# Patient Record
Sex: FEMALE | Race: ASIAN | NOT HISPANIC OR LATINO | Employment: OTHER | URBAN - METROPOLITAN AREA
[De-identification: names, ages, dates, MRNs, and addresses within clinical notes are randomized per-mention and may not be internally consistent; named-entity substitution may affect disease eponyms.]

---

## 2017-01-12 DIAGNOSIS — K51.90 ULCERATIVE COLITIS WITHOUT COMPLICATIONS (HCC): ICD-10-CM

## 2017-01-12 RX ORDER — GOLIMUMAB 100 MG/ML
INJECTION, SOLUTION SUBCUTANEOUS
Qty: 3 ML | Refills: 11 | Status: SHIPPED | OUTPATIENT
Start: 2017-01-12 | End: 2017-12-12 | Stop reason: SDUPTHER

## 2017-02-06 ENCOUNTER — TELEPHONE (OUTPATIENT)
Dept: GASTROENTEROLOGY | Facility: CLINIC | Age: 62
End: 2017-02-06

## 2017-02-06 ENCOUNTER — PREP FOR SURGERY (OUTPATIENT)
Dept: GASTROENTEROLOGY | Facility: CLINIC | Age: 62
End: 2017-02-06

## 2017-02-06 DIAGNOSIS — K51.00 ULCERATIVE PANCOLITIS WITHOUT COMPLICATION (HCC): Primary | ICD-10-CM

## 2017-02-06 RX ORDER — SODIUM CHLORIDE 0.9 % (FLUSH) 0.9 %
1-10 SYRINGE (ML) INJECTION AS NEEDED
Status: CANCELLED | OUTPATIENT
Start: 2017-02-06

## 2017-02-06 NOTE — TELEPHONE ENCOUNTER
----- Message from Rose Juarez sent at 2/6/2017  9:56 AM EST -----  Regarding: PT CALLED - [facility]  Contact: [phone]  DOES PT NEED NEW CASE REQUEST. SHE IS REQUESTING SOMETHING LATE APR.

## 2017-04-25 ENCOUNTER — ANESTHESIA (OUTPATIENT)
Dept: GASTROENTEROLOGY | Facility: HOSPITAL | Age: 62
End: 2017-04-25

## 2017-04-25 ENCOUNTER — ANESTHESIA EVENT (OUTPATIENT)
Dept: GASTROENTEROLOGY | Facility: HOSPITAL | Age: 62
End: 2017-04-25

## 2017-04-25 ENCOUNTER — HOSPITAL ENCOUNTER (OUTPATIENT)
Facility: HOSPITAL | Age: 62
Setting detail: HOSPITAL OUTPATIENT SURGERY
Discharge: HOME | End: 2017-04-25
Attending: INTERNAL MEDICINE | Admitting: INTERNAL MEDICINE

## 2017-04-25 VITALS
TEMPERATURE: 97.2 F | HEART RATE: 63 BPM | RESPIRATION RATE: 12 BRPM | OXYGEN SATURATION: 96 % | SYSTOLIC BLOOD PRESSURE: 104 MMHG | DIASTOLIC BLOOD PRESSURE: 70 MMHG | HEIGHT: 61 IN | WEIGHT: 125 LBS | BODY MASS INDEX: 23.6 KG/M2

## 2017-04-25 DIAGNOSIS — K51.00 ULCERATIVE PANCOLITIS WITHOUT COMPLICATION (HCC): ICD-10-CM

## 2017-04-25 LAB
ALBUMIN SERPL-MCNC: 3.8 G/DL (ref 3.5–5.2)
ALBUMIN/GLOB SERPL: 1 G/DL
ALP SERPL-CCNC: 59 U/L (ref 39–117)
ALT SERPL W P-5'-P-CCNC: 25 U/L (ref 1–33)
ANION GAP SERPL CALCULATED.3IONS-SCNC: 12.2 MMOL/L
AST SERPL-CCNC: 25 U/L (ref 1–32)
BASOPHILS # BLD AUTO: 0.04 10*3/MM3 (ref 0–0.2)
BASOPHILS NFR BLD AUTO: 0.7 % (ref 0–1.5)
BILIRUB SERPL-MCNC: 0.5 MG/DL (ref 0.1–1.2)
BUN BLD-MCNC: 8 MG/DL (ref 8–23)
BUN/CREAT SERPL: 12.1 (ref 7–25)
CALCIUM SPEC-SCNC: 9.3 MG/DL (ref 8.6–10.5)
CHLORIDE SERPL-SCNC: 102 MMOL/L (ref 98–107)
CO2 SERPL-SCNC: 25.8 MMOL/L (ref 22–29)
CREAT BLD-MCNC: 0.66 MG/DL (ref 0.57–1)
DEPRECATED RDW RBC AUTO: 45 FL (ref 37–54)
EOSINOPHIL # BLD AUTO: 0.1 10*3/MM3 (ref 0–0.7)
EOSINOPHIL NFR BLD AUTO: 1.7 % (ref 0.3–6.2)
ERYTHROCYTE [DISTWIDTH] IN BLOOD BY AUTOMATED COUNT: 13.3 % (ref 11.7–13)
GFR SERPL CREATININE-BSD FRML MDRD: 110 ML/MIN/1.73
GFR SERPL CREATININE-BSD FRML MDRD: 91 ML/MIN/1.73
GLOBULIN UR ELPH-MCNC: 3.8 GM/DL
GLUCOSE BLD-MCNC: 84 MG/DL (ref 65–99)
HCT VFR BLD AUTO: 42.8 % (ref 35.6–45.5)
HGB BLD-MCNC: 14.5 G/DL (ref 11.9–15.5)
IMM GRANULOCYTES # BLD: 0 10*3/MM3 (ref 0–0.03)
IMM GRANULOCYTES NFR BLD: 0 % (ref 0–0.5)
LYMPHOCYTES # BLD AUTO: 2.65 10*3/MM3 (ref 0.9–4.8)
LYMPHOCYTES NFR BLD AUTO: 45.4 % (ref 19.6–45.3)
MCH RBC QN AUTO: 31.5 PG (ref 26.9–32)
MCHC RBC AUTO-ENTMCNC: 33.9 G/DL (ref 32.4–36.3)
MCV RBC AUTO: 92.8 FL (ref 80.5–98.2)
MONOCYTES # BLD AUTO: 0.41 10*3/MM3 (ref 0.2–1.2)
MONOCYTES NFR BLD AUTO: 7 % (ref 5–12)
NEUTROPHILS # BLD AUTO: 2.64 10*3/MM3 (ref 1.9–8.1)
NEUTROPHILS NFR BLD AUTO: 45.2 % (ref 42.7–76)
PLATELET # BLD AUTO: 250 10*3/MM3 (ref 140–500)
PMV BLD AUTO: 9.1 FL (ref 6–12)
POTASSIUM BLD-SCNC: 4.2 MMOL/L (ref 3.5–5.2)
PROT SERPL-MCNC: 7.6 G/DL (ref 6–8.5)
RBC # BLD AUTO: 4.61 10*6/MM3 (ref 3.9–5.2)
SODIUM BLD-SCNC: 140 MMOL/L (ref 136–145)
WBC NRBC COR # BLD: 5.84 10*3/MM3 (ref 4.5–10.7)

## 2017-04-25 PROCEDURE — 80053 COMPREHEN METABOLIC PANEL: CPT | Performed by: INTERNAL MEDICINE

## 2017-04-25 PROCEDURE — 25010000002 ONDANSETRON PER 1 MG: Performed by: ANESTHESIOLOGY

## 2017-04-25 PROCEDURE — 85025 COMPLETE CBC W/AUTO DIFF WBC: CPT | Performed by: INTERNAL MEDICINE

## 2017-04-25 PROCEDURE — 25010000002 PROPOFOL 10 MG/ML EMULSION: Performed by: ANESTHESIOLOGY

## 2017-04-25 PROCEDURE — 88305 TISSUE EXAM BY PATHOLOGIST: CPT | Performed by: INTERNAL MEDICINE

## 2017-04-25 PROCEDURE — 45380 COLONOSCOPY AND BIOPSY: CPT | Performed by: INTERNAL MEDICINE

## 2017-04-25 RX ORDER — LIDOCAINE HYDROCHLORIDE 20 MG/ML
INJECTION, SOLUTION INFILTRATION; PERINEURAL AS NEEDED
Status: DISCONTINUED | OUTPATIENT
Start: 2017-04-25 | End: 2017-04-25 | Stop reason: SURG

## 2017-04-25 RX ORDER — SODIUM CHLORIDE, SODIUM LACTATE, POTASSIUM CHLORIDE, CALCIUM CHLORIDE 600; 310; 30; 20 MG/100ML; MG/100ML; MG/100ML; MG/100ML
30 INJECTION, SOLUTION INTRAVENOUS CONTINUOUS PRN
Status: DISCONTINUED | OUTPATIENT
Start: 2017-04-25 | End: 2017-04-25 | Stop reason: HOSPADM

## 2017-04-25 RX ORDER — ONDANSETRON 2 MG/ML
INJECTION INTRAMUSCULAR; INTRAVENOUS AS NEEDED
Status: DISCONTINUED | OUTPATIENT
Start: 2017-04-25 | End: 2017-04-25 | Stop reason: SURG

## 2017-04-25 RX ORDER — PROPOFOL 10 MG/ML
VIAL (ML) INTRAVENOUS AS NEEDED
Status: DISCONTINUED | OUTPATIENT
Start: 2017-04-25 | End: 2017-04-25 | Stop reason: SURG

## 2017-04-25 RX ORDER — SODIUM CHLORIDE 0.9 % (FLUSH) 0.9 %
1-10 SYRINGE (ML) INJECTION AS NEEDED
Status: DISCONTINUED | OUTPATIENT
Start: 2017-04-25 | End: 2017-04-25 | Stop reason: HOSPADM

## 2017-04-25 RX ADMIN — SODIUM CHLORIDE, POTASSIUM CHLORIDE, SODIUM LACTATE AND CALCIUM CHLORIDE 30 ML/HR: 600; 310; 30; 20 INJECTION, SOLUTION INTRAVENOUS at 10:56

## 2017-04-25 RX ADMIN — LIDOCAINE HYDROCHLORIDE 100 MG: 20 INJECTION, SOLUTION INFILTRATION; PERINEURAL at 11:14

## 2017-04-25 RX ADMIN — PROPOFOL 200 MG: 10 INJECTION, EMULSION INTRAVENOUS at 11:12

## 2017-04-25 RX ADMIN — ONDANSETRON 4 MG: 2 INJECTION INTRAMUSCULAR; INTRAVENOUS at 11:16

## 2017-04-25 NOTE — H&P
Chief Complaint   Patient presents with   • Follow-up         History of Present Illness: She is on monthly SImponi and has been on it for 1-2 yrs. Her PPD was +, she then went to see a Pulmonary Doctor. Her Quantiferon Gold test was negative 9/16. She feels OK. NO fevers,chills, night sweats. No diarrhea. No Rectal bleeding. No abdominal or chest pain. No nausea or vomting. Weight increasing. She also takes Asaccol  mg 2/day in addition to the Simponi.      Medical History          Past Medical History   Diagnosis Date   • Anemia     • Chronic pancolonic ulcerative colitis         DIAGNOSED 25 YEARS AGO   • Internal hemorrhoids     • Leiomyoma               Surgical History           Past Surgical History   Procedure Laterality Date   • Hysterectomy       • Endoscopy   03/20/2015       ERYTHEMATOUS MUCOSA IN STOMACH, LEIOMYOMA, REACTIVE MUCOSAL CHANGES, CHRONIC INFLAMMATION   • Colonoscopy   03/20/2015       MILD TO MODERATE PANCOLITIS W/SCARRING THROUGHOUT COLON, IH, ACUTE CRYPTITIS, ACTIVE COLITIS   • Upper gastrointestinal endoscopy   03/20/2015       COMPLETED BY DR. MATT FIGUEROA               Current Outpatient Prescriptions:   • PARoxetine (PAXIL) 10 MG tablet, , Disp: , Rfl:   • SIMPONI 100 MG/ML solution auto-injector, INJECT 2 PENS UNDER THE SKIN ON DAY 1 AND 1 PEN UNDER THE SKIN ON DAY 15, Disp: 3 syringe, Rfl: 11  • mesalamine (ASACOL HD) 800 MG EC tablet, Take two tablets by mouth twice a day, Disp: 120 tablet, Rfl: 6  • mesalamine (CANASA) 1000 MG suppository, Insert 1,000 mg into the rectum nightly., Disp: , Rfl:      No Known Allergies           Family History   Problem Relation Age of Onset   • Liver cancer Mother            Social History    Social History            Social History   • Marital status:        Spouse name: N/A   • Number of children: N/A   • Years of education: N/A          Occupational History   • Not on file.              Social History Main Topics    • Smoking status:  Never Smoker    • Smokeless tobacco: Never Used    • Alcohol use Yes         Comment: on occ    • Drug use: No    • Sexual activity: Not on file       Other Topics Concern   • Not on file      Social History Narrative            Review of Systems   All other systems reviewed and are negative.            Vitals:     10/05/16 1346   BP: 110/70         Physical Exam   Constitutional: She is oriented to person, place, and time. She appears well-developed and well-nourished. No distress.   HENT:   Head: Normocephalic and atraumatic. Hair is normal.   Right Ear: Hearing, tympanic membrane, external ear and ear canal normal. No drainage. No decreased hearing is noted.   Left Ear: Hearing, tympanic membrane, external ear and ear canal normal. No decreased hearing is noted.   Nose: No nasal deformity.   Mouth/Throat: Oropharynx is clear and moist.   Eyes: Conjunctivae, EOM and lids are normal. Pupils are equal, round, and reactive to light. Right eye exhibits no discharge. Left eye exhibits no discharge.   Neck: Normal range of motion. Neck supple. No JVD present. No tracheal deviation present. No thyromegaly present.   Cardiovascular: Normal rate, regular rhythm, normal heart sounds, intact distal pulses and normal pulses. Exam reveals no gallop and no friction rub.   No murmur heard.  Pulmonary/Chest: Effort normal and breath sounds normal. No respiratory distress. She has no wheezes. She has no rales. She exhibits no tenderness.   Abdominal: Soft. Bowel sounds are normal. She exhibits no distension and no mass. There is no tenderness. There is no rebound and no guarding. No hernia.   Musculoskeletal: Normal range of motion. She exhibits no edema, tenderness or deformity.   Lymphadenopathy:   She has no cervical adenopathy.   Neurological: She is alert and oriented to person, place, and time. She has normal reflexes. She displays normal reflexes. No cranial nerve deficit. She exhibits normal muscle tone. Coordination  normal.   Skin: Skin is warm and dry. No rash noted. She is not diaphoretic. No erythema.   Psychiatric: She has a normal mood and affect. Her behavior is normal. Judgment and thought content normal.   Vitals reviewed.        Amber was seen today for follow-up.     Diagnoses and all orders for this visit:     Ulcerative colitis without complications, unspecified location  - Case Request; Standing  - sodium chloride 0.9 % flush 1-10 mL; Infuse 1-10 mL into a venous catheter As Needed for line care.  - Case Request  - CBC & Differential  - Comprehensive Metabolic Panel  - QuantiFERON TB Gold     Immunocompromised  - CBC & Differential  - Comprehensive Metabolic Panel  - QuantiFERON TB Gold     Other orders  - Implement Anesthesia orders day of procedure.; Standing  - Obtain informed consent; Standing  - Verify bowel prep was successful; Standing  - Give tap water enema if bowel prep was insufficient; Standing  - Insert Peripheral IV; Standing  - Saline Lock & Maintain IV Access; Standing        Assessment:  1) h/o ulcerative colitis diagnosed 30 yrs ago. Doing well on Simponi  2) h/o false + PPD with Quantiferon Gold - 9/15.     Recommendations:  1) Labs: Quantiferon Gold, CBC, CMP  2) Continue Simponi and the Asacol  mg 2/day.  3) Repeat colonoscopy in 3/2017.     4/25/17 - NO change in the above H and P. Joni Lucia M.D.

## 2017-04-25 NOTE — PLAN OF CARE
Problem: Patient Care Overview (Adult)  Goal: Plan of Care Review  Outcome: Ongoing (interventions implemented as appropriate)    04/25/17 1025   Coping/Psychosocial Response Interventions   Plan Of Care Reviewed With patient       Goal: Adult Individualization and Mutuality  Outcome: Ongoing (interventions implemented as appropriate)  Goal: Discharge Needs Assessment  Outcome: Ongoing (interventions implemented as appropriate)    04/25/17 1025   Discharge Needs Assessment   Concerns To Be Addressed no discharge needs identified   Discharge Disposition home or self-care   Living Environment   Transportation Available car;family or friend will provide         Problem: GI Endoscopy (Adult)  Intervention: Monitor/Manage Procedure Recovery    04/25/17 1025   Respiratory Interventions   Airway/Ventilation Management airway patency maintained   Coping/Psychosocial Interventions   Environmental Support calm environment promoted   Activity   Activity Type activity adjusted per tolerance   Cardiac Interventions   Warming Thermoregulation Maintenance warm blankets applied       Intervention: Prevent Kimberly-procedural Injury    04/25/17 1025   Positioning   Positioning high Fowlers   Head Of Bed (HOB) Position HOB elevated         Goal: Signs and Symptoms of Listed Potential Problems Will be Absent or Manageable (GI Endoscopy)  Outcome: Ongoing (interventions implemented as appropriate)    04/25/17 1025   GI Endoscopy   Problems Assessed (GI Endoscopy) all

## 2017-04-25 NOTE — ANESTHESIA POSTPROCEDURE EVALUATION
Patient: Amber Sesay    Procedure Summary     Date Anesthesia Start Anesthesia Stop Room / Location    04/25/17 1111 1135  [facility]       Procedure Diagnosis Surgeon Provider    COLONOSCOPY to cecum and terminal ileum with biopsy (N/A ) Ulcerative pancolitis without complication  (Ulcerative pancolitis without complication [K51.00]) MD Ria Pelletier MD          Anesthesia Type: MAC  Last vitals  /70 (04/25/17 1148)    Temp      Pulse 63 (04/25/17 1148)   Resp 12 (04/25/17 1148)    SpO2 96 % (04/25/17 1148)      Post Anesthesia Care and Evaluation    Patient location during evaluation: PACU  Patient participation: complete - patient participated  Level of consciousness: awake  Pain management: adequate  Airway patency: patent  Anesthetic complications: No anesthetic complications  PONV Status: none  Cardiovascular status: acceptable  Respiratory status: acceptable  Hydration status: acceptable

## 2017-04-25 NOTE — ANESTHESIA PREPROCEDURE EVALUATION
Anesthesia Evaluation     Patient summary reviewed and Nursing notes reviewed   history of anesthetic complications:     Airway   Mallampati: II  TM distance: >3 FB  Neck ROM: full  possible difficult intubation  Dental - normal exam     Pulmonary - normal exam   Cardiovascular - normal exam        Neuro/Psych  GI/Hepatic/Renal/Endo      Musculoskeletal     Abdominal  - normal exam    Bowel sounds: normal.   Substance History      OB/GYN          Other                                    Anesthesia Plan    ASA 1     MAC     Anesthetic plan and risks discussed with patient.

## 2017-04-26 LAB
CYTO UR: NORMAL
LAB AP CASE REPORT: NORMAL
Lab: NORMAL
PATH REPORT.FINAL DX SPEC: NORMAL
PATH REPORT.GROSS SPEC: NORMAL

## 2017-05-01 ENCOUNTER — TELEPHONE (OUTPATIENT)
Dept: GASTROENTEROLOGY | Facility: CLINIC | Age: 62
End: 2017-05-01

## 2017-06-27 ENCOUNTER — TELEPHONE (OUTPATIENT)
Dept: GASTROENTEROLOGY | Facility: CLINIC | Age: 62
End: 2017-06-27

## 2017-06-28 NOTE — TELEPHONE ENCOUNTER
Per Agapito and patient--- her insurance plan will not cover Asacol but will cover Apriso-a new rx needs to be sent in please w/strength, quantity and refills.  Leslie RAYMOND    Message to Dr Lucia.

## 2017-06-30 RX ORDER — MESALAMINE 0.38 G/1
CAPSULE, EXTENDED RELEASE ORAL
Qty: 180 CAPSULE | Refills: 3 | Status: SHIPPED | OUTPATIENT
Start: 2017-06-30 | End: 2018-06-20 | Stop reason: SDUPTHER

## 2017-06-30 NOTE — TELEPHONE ENCOUNTER
Please send in a prescription for her for Apriso (there is only one strength) 2 po daily. You could give a 30 day or 90 day supply with one year of refills. darin

## 2017-11-01 ENCOUNTER — OFFICE VISIT (OUTPATIENT)
Dept: GASTROENTEROLOGY | Facility: CLINIC | Age: 62
End: 2017-11-01

## 2017-11-01 VITALS
DIASTOLIC BLOOD PRESSURE: 72 MMHG | HEIGHT: 61 IN | TEMPERATURE: 97.7 F | BODY MASS INDEX: 23.3 KG/M2 | WEIGHT: 123.4 LBS | SYSTOLIC BLOOD PRESSURE: 114 MMHG

## 2017-11-01 DIAGNOSIS — K51.30 ULCERATIVE RECTOSIGMOIDITIS WITHOUT COMPLICATION (HCC): Primary | ICD-10-CM

## 2017-11-01 PROCEDURE — 99213 OFFICE O/P EST LOW 20 MIN: CPT | Performed by: INTERNAL MEDICINE

## 2017-11-01 NOTE — PROGRESS NOTES
Chief Complaint   Patient presents with   • Follow-up   • Ulcerative Colitis       History of Present Illness: 60 yo female with a h/o left sided ulcerative colitis diagnosed about 30 yrs ago, now on Simponi. We reviewed the results of the 4/17 colonsocopy. In 10/16 her Quantiferon Gold test was negative. (she had a false positive PPD) Last dose of Simponi last week. On Simponi x 4 yrs (minus the 8 mos). She is on Apriso 1 BID.  No diarrhea or rectal bleeding. No melena. No constipation. NO abdominal or chest pain. No nausea or vomting. NO fevers, chills, night sweats. No coughing or SOA. Weight stable.     Past Medical History:   Diagnosis Date   • Anemia    • Chronic pancolonic ulcerative colitis     DIAGNOSED 25 YEARS AGO   • History of transfusion    • Internal hemorrhoids    • Leiomyoma    • PONV (postoperative nausea and vomiting)        Past Surgical History:   Procedure Laterality Date   • COLONOSCOPY  03/20/2015    MILD TO MODERATE PANCOLITIS W/SCARRING THROUGHOUT COLON, IH, ACUTE CRYPTITIS, ACTIVE COLITIS   • COLONOSCOPY N/A 4/25/2017    Lots of scarring throughout the colon from her long h/o of ulcerative colitis, mild left sided ulcerative colitis, IH.  PATH: mild chronic active colitis.     • ENDOSCOPY  03/20/2015    ERYTHEMATOUS MUCOSA IN STOMACH, LEIOMYOMA, REACTIVE MUCOSAL CHANGES, CHRONIC INFLAMMATION   • HYSTERECTOMY     • UPPER GASTROINTESTINAL ENDOSCOPY  03/20/2015    COMPLETED BY DR. MATT FIGUEROA         Current Outpatient Prescriptions:   •  mesalamine (APRISO) 0.375 G 24 hr capsule, Take 2 tablets by mouth daily, Disp: 180 capsule, Rfl: 3  •  PARoxetine (PAXIL) 10 MG tablet, Take 10 mg by mouth Every Morning., Disp: , Rfl:   •  SIMPONI 100 MG/ML solution auto-injector, INJECT 100 MG UNDER THE SKIN EVERY FOUR WEEKS, Disp: 3 mL, Rfl: 11    No Known Allergies    Family History   Problem Relation Age of Onset   • Liver cancer Mother        Social History     Social History   • Marital status:       Spouse name: N/A   • Number of children: N/A   • Years of education: N/A     Occupational History   • Not on file.     Social History Main Topics   • Smoking status: Never Smoker   • Smokeless tobacco: Never Used   • Alcohol use Yes      Comment: on occ   • Drug use: No   • Sexual activity: Defer     Other Topics Concern   • Not on file     Social History Narrative       Review of Systems   All other systems reviewed and are negative.      Vitals:    11/01/17 1121   BP: 114/72   Temp: 97.7 °F (36.5 °C)       Physical Exam   Constitutional: She is oriented to person, place, and time. She appears well-developed and well-nourished. No distress.   HENT:   Head: Normocephalic and atraumatic. Hair is normal.   Right Ear: Hearing, tympanic membrane, external ear and ear canal normal. No drainage. No decreased hearing is noted.   Left Ear: Hearing, tympanic membrane, external ear and ear canal normal. No decreased hearing is noted.   Nose: No nasal deformity.   Mouth/Throat: Oropharynx is clear and moist.   Eyes: Conjunctivae, EOM and lids are normal. Pupils are equal, round, and reactive to light. Right eye exhibits no discharge. Left eye exhibits no discharge.   Neck: Normal range of motion. Neck supple. No JVD present. No tracheal deviation present. No thyromegaly present.   Cardiovascular: Normal rate, regular rhythm, normal heart sounds, intact distal pulses and normal pulses.  Exam reveals no gallop and no friction rub.    No murmur heard.  Pulmonary/Chest: Effort normal and breath sounds normal. No respiratory distress. She has no wheezes. She has no rales. She exhibits no tenderness.   Abdominal: Soft. Bowel sounds are normal. She exhibits no distension and no mass. There is no tenderness. There is no rebound and no guarding. No hernia.   Musculoskeletal: Normal range of motion. She exhibits no edema, tenderness or deformity.   Lymphadenopathy:     She has no cervical adenopathy.   Neurological: She is  alert and oriented to person, place, and time. She has normal reflexes. She displays normal reflexes. No cranial nerve deficit. She exhibits normal muscle tone. Coordination normal.   Skin: Skin is warm and dry. No rash noted. She is not diaphoretic. No erythema.   Psychiatric: She has a normal mood and affect. Her behavior is normal. Judgment and thought content normal.   Vitals reviewed.      Amber was seen today for follow-up and ulcerative colitis.    Diagnoses and all orders for this visit:    Ulcerative rectosigmoiditis without complication  -     QuantiFERON TB Gold  -     C-reactive Protein  -     Sedimentation Rate  -     CBC & Differential  -     Comprehensive Metabolic Panel      Assessment:  1) h/o left sided ulcerative colitis, on Simponi  2) h/o +PPD (false +) but her Quantiferon Gold has been negative.    Recommendations:  1) Repeat c/s in 4/19.  2) Labs: Quantiferron Gold, CBC, etc.  3) Send this note and the results of the C/S done in 4/17 to her PCP in [place], [place].  4) Continue Apriso 1 BID and Simponi 100 mg SQ q 4 weeks.   5) f/u one year.     Return in about 1 year (around 11/1/2018).    Joni Lucia MD  11/1/2017

## 2017-11-04 LAB
ALBUMIN SERPL-MCNC: 4.8 G/DL (ref 3.6–4.8)
ALBUMIN/GLOB SERPL: 1.7 {RATIO} (ref 1.2–2.2)
ALP SERPL-CCNC: 75 IU/L (ref 39–117)
ALT SERPL-CCNC: 35 IU/L (ref 0–32)
ANNOTATION COMMENT IMP: NORMAL
AST SERPL-CCNC: 25 IU/L (ref 0–40)
BASOPHILS # BLD AUTO: 0 X10E3/UL (ref 0–0.2)
BASOPHILS NFR BLD AUTO: 0 %
BILIRUB SERPL-MCNC: 0.3 MG/DL (ref 0–1.2)
BUN SERPL-MCNC: 14 MG/DL (ref 8–27)
BUN/CREAT SERPL: 26 (ref 12–28)
CALCIUM SERPL-MCNC: 9.5 MG/DL (ref 8.7–10.3)
CHLORIDE SERPL-SCNC: 100 MMOL/L (ref 96–106)
CO2 SERPL-SCNC: 25 MMOL/L (ref 18–29)
CREAT SERPL-MCNC: 0.54 MG/DL (ref 0.57–1)
CRP SERPL-MCNC: 1.3 MG/L (ref 0–4.9)
EOSINOPHIL # BLD AUTO: 0 X10E3/UL (ref 0–0.4)
EOSINOPHIL NFR BLD AUTO: 1 %
ERYTHROCYTE [DISTWIDTH] IN BLOOD BY AUTOMATED COUNT: 13.3 % (ref 12.3–15.4)
ERYTHROCYTE [SEDIMENTATION RATE] IN BLOOD BY WESTERGREN METHOD: 8 MM/HR (ref 0–40)
GAMMA INTERFERON BACKGROUND BLD IA-ACNC: 0.14 IU/ML
GFR SERPLBLD CREATININE-BSD FMLA CKD-EPI: 101 ML/MIN/1.73
GFR SERPLBLD CREATININE-BSD FMLA CKD-EPI: 117 ML/MIN/1.73
GLOBULIN SER CALC-MCNC: 2.9 G/DL (ref 1.5–4.5)
GLUCOSE SERPL-MCNC: 82 MG/DL (ref 65–99)
HCT VFR BLD AUTO: 43.3 % (ref 34–46.6)
HGB BLD-MCNC: 15.1 G/DL (ref 11.1–15.9)
IMM GRANULOCYTES # BLD: 0 X10E3/UL (ref 0–0.1)
IMM GRANULOCYTES NFR BLD: 0 %
LYMPHOCYTES # BLD AUTO: 3.3 X10E3/UL (ref 0.7–3.1)
LYMPHOCYTES NFR BLD AUTO: 38 %
M TB IFN-G BLD-IMP: NEGATIVE
M TB IFN-G CD4+ BCKGRND COR BLD-ACNC: <0 IU/ML
M TB IFN-G CD4+ T-CELLS BLD-ACNC: 0.08 IU/ML
MCH RBC QN AUTO: 31.9 PG (ref 26.6–33)
MCHC RBC AUTO-ENTMCNC: 34.9 G/DL (ref 31.5–35.7)
MCV RBC AUTO: 92 FL (ref 79–97)
MITOGEN IGNF BLD-ACNC: >10 IU/ML
MONOCYTES # BLD AUTO: 0.6 X10E3/UL (ref 0.1–0.9)
MONOCYTES NFR BLD AUTO: 7 %
NEUTROPHILS # BLD AUTO: 4.8 X10E3/UL (ref 1.4–7)
NEUTROPHILS NFR BLD AUTO: 54 %
PLATELET # BLD AUTO: 334 X10E3/UL (ref 150–379)
POTASSIUM SERPL-SCNC: 5.1 MMOL/L (ref 3.5–5.2)
PROT SERPL-MCNC: 7.7 G/DL (ref 6–8.5)
QUANTIFERON INCUBATION: NORMAL
RBC # BLD AUTO: 4.73 X10E6/UL (ref 3.77–5.28)
SERVICE CMNT-IMP: NORMAL
SODIUM SERPL-SCNC: 141 MMOL/L (ref 134–144)
WBC # BLD AUTO: 8.8 X10E3/UL (ref 3.4–10.8)

## 2017-11-10 NOTE — PROGRESS NOTES
Tell her that her TB test (the Quantiferon Gold test) came back negative, which is good.  Her CBC also came back normal.  Her liver function tests were normal except one liver function test (ALT of 35) came back minimally elevated.  I would recommend that she come back in one month to have repeat lab work done.  I would have her get another comprehensive metabolic profile and a hepatitis profile looking for hepatitis A, B, and C.  Please order these tests and I will cosign the order.

## 2017-11-15 ENCOUNTER — TELEPHONE (OUTPATIENT)
Dept: GASTROENTEROLOGY | Facility: CLINIC | Age: 62
End: 2017-11-15

## 2017-11-15 DIAGNOSIS — R79.89 ELEVATED LFTS: Primary | ICD-10-CM

## 2017-11-15 NOTE — TELEPHONE ENCOUNTER
Called pt and advised per Dr Lucia that her tb test came back neg, which is good.  Her cbc also came back normal.  Her liver function tests were normal except one liver function test (ALT of 35) came back min elevated.  He recommends that she get repeat lab in one month.  Pt verb understanding and made lab appt for 12/13 at 11am.    Lab work - cmp and hep profile ordered.  Message sent to Dr Lucia to cosign orders.

## 2017-11-15 NOTE — TELEPHONE ENCOUNTER
----- Message from Joni Lucia MD sent at 11/10/2017  6:47 AM EST -----  Tell her that her TB test (the Quantiferon Gold test) came back negative, which is good.  Her CBC also came back normal.  Her liver function tests were normal except one liver function test (ALT of 35) came back minimally elevated.  I would recommend that she come back in one month to have repeat lab work done.  I would have her get another comprehensive metabolic profile and a hepatitis profile looking for hepatitis A, B, and C.  Please order these tests and I will cosign the order.

## 2017-11-29 ENCOUNTER — TELEPHONE (OUTPATIENT)
Dept: SURGERY | Facility: CLINIC | Age: 62
End: 2017-11-29

## 2017-11-29 NOTE — TELEPHONE ENCOUNTER
New pt scheduled for 12/19/2017, arrive at 10:30.  Called and left message for pt to call back for confirmation.     Need to prescreen for possible breast MRI.    PT CALLED CONFIRMED APPT.     PRESCREENED FOR POS MRI  GOOD ON ALL QUESTIONS  HT 5'1  WT  120-125

## 2017-12-03 DIAGNOSIS — D05.10 DUCTAL CARCINOMA IN SITU (DCIS) OF BREAST, UNSPECIFIED LATERALITY: Primary | ICD-10-CM

## 2017-12-07 ENCOUNTER — HOSPITAL ENCOUNTER (OUTPATIENT)
Dept: MRI IMAGING | Facility: HOSPITAL | Age: 62
Discharge: HOME | End: 2017-12-07
Attending: SURGERY | Admitting: FAMILY MEDICINE

## 2017-12-07 DIAGNOSIS — D05.10 DUCTAL CARCINOMA IN SITU (DCIS) OF BREAST, UNSPECIFIED LATERALITY: ICD-10-CM

## 2017-12-07 PROCEDURE — C8908 MRI W/O FOL W/CONT, BREAST,: HCPCS

## 2017-12-07 PROCEDURE — 0 GADOBENATE DIMEGLUMINE 529 MG/ML SOLUTION: Performed by: SURGERY

## 2017-12-07 PROCEDURE — 82565 ASSAY OF CREATININE: CPT

## 2017-12-07 PROCEDURE — A9577 INJ MULTIHANCE: HCPCS | Performed by: SURGERY

## 2017-12-07 PROCEDURE — 0159T HC MRI BREAST COMPUTER ANALYSIS: CPT

## 2017-12-07 RX ADMIN — GADOBENATE DIMEGLUMINE 11 ML: 529 INJECTION, SOLUTION INTRAVENOUS at 15:44

## 2017-12-08 LAB — CREAT BLDA-MCNC: 0.8 MG/DL (ref 0.6–1.3)

## 2017-12-12 DIAGNOSIS — K51.90 ULCERATIVE COLITIS WITHOUT COMPLICATIONS (HCC): ICD-10-CM

## 2017-12-14 LAB
ALBUMIN SERPL-MCNC: 4.5 G/DL (ref 3.5–5.2)
ALBUMIN/GLOB SERPL: 1.3 G/DL
ALP SERPL-CCNC: 68 U/L (ref 39–117)
ALT SERPL-CCNC: 19 U/L (ref 1–33)
AST SERPL-CCNC: 21 U/L (ref 1–32)
BILIRUB SERPL-MCNC: 0.4 MG/DL (ref 0.1–1.2)
BUN SERPL-MCNC: 14 MG/DL (ref 8–23)
BUN/CREAT SERPL: 22.6 (ref 7–25)
CALCIUM SERPL-MCNC: 10 MG/DL (ref 8.6–10.5)
CHLORIDE SERPL-SCNC: 100 MMOL/L (ref 98–107)
CO2 SERPL-SCNC: 28.8 MMOL/L (ref 22–29)
CREAT SERPL-MCNC: 0.62 MG/DL (ref 0.57–1)
GFR SERPLBLD CREATININE-BSD FMLA CKD-EPI: 118 ML/MIN/1.73
GFR SERPLBLD CREATININE-BSD FMLA CKD-EPI: 97 ML/MIN/1.73
GLOBULIN SER CALC-MCNC: 3.5 GM/DL
GLUCOSE SERPL-MCNC: 79 MG/DL (ref 65–99)
HAV IGM SERPL QL IA: NEGATIVE
HBV CORE IGM SERPL QL IA: NEGATIVE
HBV SURFACE AG SERPL QL IA: NEGATIVE
HCV AB S/CO SERPL IA: <0.1 S/CO RATIO (ref 0–0.9)
POTASSIUM SERPL-SCNC: 5 MMOL/L (ref 3.5–5.2)
PROT SERPL-MCNC: 8 G/DL (ref 6–8.5)
SODIUM SERPL-SCNC: 140 MMOL/L (ref 136–145)

## 2017-12-14 RX ORDER — GOLIMUMAB 100 MG/ML
INJECTION, SOLUTION SUBCUTANEOUS
Qty: 3 ML | Refills: 11 | Status: SHIPPED | OUTPATIENT
Start: 2017-12-14 | End: 2018-01-05

## 2017-12-18 ENCOUNTER — OFFICE VISIT (OUTPATIENT)
Dept: SURGERY | Facility: CLINIC | Age: 62
End: 2017-12-18

## 2017-12-18 ENCOUNTER — TELEPHONE (OUTPATIENT)
Dept: GASTROENTEROLOGY | Facility: CLINIC | Age: 62
End: 2017-12-18

## 2017-12-18 ENCOUNTER — PREP FOR SURGERY (OUTPATIENT)
Dept: OTHER | Facility: HOSPITAL | Age: 62
End: 2017-12-18

## 2017-12-18 VITALS
SYSTOLIC BLOOD PRESSURE: 145 MMHG | HEIGHT: 61 IN | WEIGHT: 126 LBS | BODY MASS INDEX: 23.79 KG/M2 | DIASTOLIC BLOOD PRESSURE: 83 MMHG | HEART RATE: 70 BPM | OXYGEN SATURATION: 97 %

## 2017-12-18 DIAGNOSIS — K51.00 ULCERATIVE PANCOLITIS WITHOUT COMPLICATION (HCC): ICD-10-CM

## 2017-12-18 DIAGNOSIS — Z87.898 HISTORY OF POSTOPERATIVE NAUSEA AND VOMITING: ICD-10-CM

## 2017-12-18 DIAGNOSIS — D05.12 BREAST NEOPLASM, TIS (DCIS), LEFT: Primary | ICD-10-CM

## 2017-12-18 DIAGNOSIS — D05.12 DUCTAL CARCINOMA IN SITU (DCIS) OF LEFT BREAST: Primary | ICD-10-CM

## 2017-12-18 DIAGNOSIS — R92.1 BREAST CALCIFICATIONS ON MAMMOGRAM: ICD-10-CM

## 2017-12-18 DIAGNOSIS — Z80.3 FH: BREAST CANCER: ICD-10-CM

## 2017-12-18 DIAGNOSIS — Z85.41 HISTORY OF CERVICAL CANCER: ICD-10-CM

## 2017-12-18 PROCEDURE — 99244 OFF/OP CNSLTJ NEW/EST MOD 40: CPT | Performed by: SURGERY

## 2017-12-18 RX ORDER — SODIUM CHLORIDE, SODIUM LACTATE, POTASSIUM CHLORIDE, CALCIUM CHLORIDE 600; 310; 30; 20 MG/100ML; MG/100ML; MG/100ML; MG/100ML
100 INJECTION, SOLUTION INTRAVENOUS CONTINUOUS
Status: CANCELLED | OUTPATIENT
Start: 2018-01-11

## 2017-12-18 RX ORDER — DIAZEPAM 5 MG/1
10 TABLET ORAL ONCE
Status: CANCELLED | OUTPATIENT
Start: 2018-01-11 | End: 2017-12-18

## 2017-12-18 RX ORDER — LIDOCAINE AND PRILOCAINE 25; 25 MG/G; MG/G
CREAM TOPICAL ONCE
Qty: 30 G | Refills: 0 | Status: SHIPPED | OUTPATIENT
Start: 2017-12-18 | End: 2017-12-18

## 2017-12-18 NOTE — TELEPHONE ENCOUNTER
Dr Harvey's office calling requesting to speak with Dr Lucia about Ms Bravo.  Advised Dr Lucia is currently not in office but is in hospital performing procedures.  She verb understanding and left Dr Harvey's cell phone so Dr Lucia call when he is free [phone].

## 2017-12-18 NOTE — TELEPHONE ENCOUNTER
EMLA Cream 30 g tube no refills   Apply topically once for one dose to nipple areolar and 1 cm outside of nipple and cover day of surgery.    lml

## 2017-12-18 NOTE — PROGRESS NOTES
Chief Complaint: Amber Thrasher is a 62 y.o. female who was seen in consultation at the request of Dora Ross MD  for newly diagnosed breast cancer    History of Present Illness:  Patient presents with newly diagnosed DCIS, . She noted no new masses, skin changes, nipple discharge, nipple changes prior to her most recent imaging.    Her most recent imaging includes the following:   10/26/16  [facility]      MAMMO/ SCREENING MAMMO AMBER THRASHER  Heterogeneously dense.  BIRADS category 1. Negative     10/31/2017     [facility]  MAMMO SCREENING  AMBER THRASHER  There is a new group of microcalcifications lower inner left breast 3-4 cm from the nipple.   BIRADS category 0.    11/16/2017 [facility]     MAMMO/DIAG UNT LT DIGITAL AMBER THRASHER  Group of pleomorphic microcalcifications lower inner left breast 6:00 to 7:00 3-4 cm from the nipple.  BIRADS category 4        She had a biopsy on the following day that showed:   11/27/17 [facility]     MAMMO/STERIOTACTIC LOCALIZE LT     AMBER THRASHER  9 gauge Eviva.  18 core biopsy.  Tissue marker was deployed.  Post procedure mammography showed the tissue marker to be at the target site.     11/27/17 [facility] PATHOLOGY REPORT AMBER THRASHER  1. Left breast lower inner quadrant at 3-4 cm from nipple: clinically with microcalcifications:  ductal carcinoma in situ, solid and cribriform types, low grade.  Negative for invasive malignancy.    2. Left breast, lower inner quadrant at 3-4 cm from nipple; clinically tissue without microcalcifications:  single focus of ductal carcinoma in situ, solid type with microcalcification identified.    11/27/17 [facility]  HORMONE RECEPTORS  AMBER THRASHER  Estrogen  100   Progesterone   100       I then arranged for a breast MRI:  12/07/17 [facility] BREAST MRI  AMBER THRASHER  Left breast 6’oclock the order of 5cm from the nipple there is a metallic clip within a  postbiopsy cavity.  There are some thin linear enhancement seen along the anterior and posterior margins of the cavity.  The cavity and associated thin linear enhancement measured 2.2 cm in anterior to posterior dimension, 1.5 cm in the superior to inferior dimension and 2.9 cm in the mediolateral dimension.  There are no other areas of abnormal enhancement in the left breast.  No evidence for left axillary adenopathy.  There are no areas of abnormal enhancement or morphology in the right breast.    She has not had a breast biopsy in the past.  She has her ovaries, she had her uterus removed in 1990 for cervical cancer.    She is here for evaluation.    Review of Systems:  Review of Systems   All other systems reviewed and are negative.       Past Medical and Surgical History:  Breast Biopsy History:  Patient had not had a breast biopsy prior to her cancer diagnosis.  Breast Cancer HIstory:  Patient does not have a past medical history of breast cancer.  Breast Operations, and year:  None   Obstetric/Gynecologic History:  Age menstrual periods began:teens   Patient is postmenopausal due to removal of her uterus in the following year:1990   Number of pregnancies:3  Number of live births: 2  Number of abortions or miscarriages: 1  Age of delivery of first child: 22  Patient did not breast feed.  Length of time taking birth control pills: 7 yrs   Patient has never taken hormone replacement  Patient had uterus removed 1990    Past Surgical History:   Procedure Laterality Date   • COLONOSCOPY  03/20/2015    MILD TO MODERATE PANCOLITIS W/SCARRING THROUGHOUT COLON, IH, ACUTE CRYPTITIS, ACTIVE COLITIS   • COLONOSCOPY N/A 4/25/2017    Lots of scarring throughout the colon from her long h/o of ulcerative colitis, mild left sided ulcerative colitis, IH.  PATH: mild chronic active colitis.     • ENDOSCOPY  03/20/2015    ERYTHEMATOUS MUCOSA IN STOMACH, LEIOMYOMA, REACTIVE MUCOSAL CHANGES, CHRONIC INFLAMMATION   • HYSTERECTOMY    "  • UPPER GASTROINTESTINAL ENDOSCOPY  03/20/2015    COMPLETED BY DR. MATT FIGUEROA       Past Medical History:   Diagnosis Date   • Anemia    • Cervical cancer 1990   • Chronic pancolonic ulcerative colitis     DIAGNOSED 25 YEARS AGO   • History of transfusion    • Internal hemorrhoids    • Leiomyoma    • PONV (postoperative nausea and vomiting)        Prior Hospitalizations, other than for surgery or childbirth, and year:  Ulcerative colitis-Anemic     Social History     Social History   • Marital status:      Spouse name: N/A   • Number of children: N/A   • Years of education: N/A     Occupational History   • Not on file.     Social History Main Topics   • Smoking status: Never Smoker   • Smokeless tobacco: Never Used   • Alcohol use Yes      Comment: on occ   • Drug use: No   • Sexual activity: Defer     Other Topics Concern   • Not on file     Social History Narrative     Patient is .  Patient is unemployed.  Patient drinks 2 servings of caffeine per day.    Family History:  Family History   Problem Relation Age of Onset   • Liver cancer Mother    • Breast cancer Sister 73   • Breast cancer Other 38     neice gene negative    • Cancer Sister      of unknown orgin        Vital Signs:  /83  Pulse 70  Ht 154.9 cm (61\")  Wt 57.2 kg (126 lb)  SpO2 97%  BMI 23.81 kg/m2     Medications:    Current Outpatient Prescriptions:   •  mesalamine (APRISO) 0.375 G 24 hr capsule, Take 2 tablets by mouth daily, Disp: 180 capsule, Rfl: 3  •  PARoxetine (PAXIL) 10 MG tablet, Take 10 mg by mouth Every Morning., Disp: , Rfl:   •  SIMPONI 100 MG/ML solution auto-injector, INJECT 100 MG UNDER THE SKIN EVERY FOUR WEEKS, Disp: 3 mL, Rfl: 11     Allergies:  No Known Allergies    Physical Examination:  /83  Pulse 70  Ht 154.9 cm (61\")  Wt 57.2 kg (126 lb)  SpO2 97%  BMI 23.81 kg/m2  General Appearance:  Patient is in no distress.  She is well kept and has an average build.   Psychiatric:  Patient with " appropriate mood and affect. Alert and oriented to self, time, and place.    Breast, RIGHT:  small sized, symmetric with the contralateral side.  Breast skin is without erythema, edema, rashes.  There are no visible abnormalities upon inspection during the arm-raising maneuver or with hands on hips in the sitting position. There is no nipple retraction, discharge or nipple/areolar skin changes.There are no masses palpable in the sitting or supine positions.    Breast, LEFT:  small sized, symmetric with the contralateral side.  Breast skin is without erythema, edema, rashes.  There are no visible abnormalities upon inspection during the arm-raising maneuver or with hands on hips in the sitting position. There is no nipple retraction, discharge or nipple/areolar skin changes.There are no masses palpable in the sitting or supine positions. There are ecchymoses present and a well healing mammotomy with no erythema, warmth, drainage.    Lymphatic:  There is no axillary, cervical, infraclavicular, or supraclavicular adenopathy bilaterally.  Eyes:  Pupils are round and reactive to light.  Cardiovascular:  Heart rate and rhythm are regular.  Respiratory:  Lungs are clear bilaterally with no crackles or wheezes in any lung field.  Gastrointestinal:  Abdomen is soft, nondistended, and nontender.     Musculoskeletal:  Good strength in all 4 extremities.   There is good range of motion in both shoulders.    Skin:  No new skin lesions or rashes on the skin excluding the breast (see breast exam above).        Imaging:  10/26/16  [facility]      MAMMO/ SCREENING MAMMO FRANCISCA THRASHER  Heterogeneously dense.  BIRADS category 1. Negative     10/31/2017     [facility]  MAMMO SCREENING  FRANCISCA THRASHER  There is a new group of microcalcifications lower inner left breast 3-4 cm from the nipple.   BIRADS category 0.    11/16/2017 [facility]     MAMMO/DIAG UNT LT DIGITAL FRANCISCA THRASHER  Group of pleomorphic  microcalcifications lower inner left breast 6:00 to 7:00 3-4 cm from the nipple.  BIRADS category 4    12/07/17 [facility] BREAST MRI  FRANCISCA THRASHER  Left breast 6’oclock the order of 5cm from the nipple there is a metallic clip within a postbiopsy cavity.  There are some thin linear enhancement seen along the anterior and posterior margins of the cavity.  The cavity and associated thin linear enhancement measured 2.2 cm in anterior to posterior dimension, 1.5 cm in the superior to inferior dimension and 2.9 cm in the mediolateral dimension.  There are no other areas of abnormal enhancement in the left breast.  No evidence for left axillary adenopathy.  There are no areas of abnormal enhancement or morphology in the right breast.    Pathology:  11/27/17 [facility]     MAMMO/STERIOTACTIC LOCALIZE LT     FRANCISCA THRASHER  9 gauge Eviva.  18 core biopsy.  Tissue marker was deployed.  Post procedure mammography showed the tissue marker to be at the target site.     11/27/17 [facility] PATHOLOGY REPORT FRANCISCA THRASHER  3. Left breast lower inner quadrant at 3-4 cm from nipple: clinically with microcalcifications:  ductal carcinoma in situ, solid and cribriform types, low grade.  Negative for invasive malignancy.    4. Left breast, lower inner quadrant at 3-4 cm from nipple; clinically tissue without microcalcifications:  single focus of ductal carcinoma in situ, solid type with microcalcification identified.    11/27/17 [facility]  HORMONE RECEPTORS  FRANCISCA THRASHER  Estrogen  100   Progesterone   100    Procedures:      Assessment:   Diagnosis Plan   1. Ductal carcinoma in situ (DCIS) of left breast     2. Breast calcifications on mammogram     3. FH: breast cancer     4. History of cervical cancer     5. Ulcerative pancolitis without complication     6. History of postoperative nausea and vomiting     1-2  Left breast lower inner quadrant, 6:30, 3 cm from the nipple, 6 mm of calcification  clustered on her initial mammogram.  2.2 cm postbiopsy hematoma with enhancement at the perimeter of the cavity.    Clinical stage TisN 0 stage 0.    3-  1 of 2 sisters age 73.  One niece, the daughter of cysts #2 diagnosed in late 20s.    4-  Cervical cancer in 1990, treated with total abdominal hysterectomy and required no chemotherapy.    5-  Ulcerative colitis diagnosed age 34, treated by Dr. Lucia at [facility].    Plan:  We reviewed the difference in systemic therapy versus locoregional. We discussed that she will be seeing a medical oncologist in the adjuvant setting. We discussed that for DCIS, that we would expect a 98% breast cancer specific survival and that the medications offered by medical oncology are for risk reduction for additional breast cancers and for a reduction in ipsilateral locoregional recurrence.     We discussed that for unifocal DCIS, there are 2 options for locoregional treatment that have equivalent survival: total mastectomy versus lumpectomy and radiation, the former with sentinel node biopsy.     She is interested in breast conservation.     We discussed the option of oncoplastic closure with contralateral mastopexy, and she is not interested in this at this time.    We discussed her having a plastic surgical consultation in the postoperative period as an alternative option, if she finds that she is dissatisfied with her symmetry. We did discuss that most plastic surgeons will not operate on the irradiated breast, but could perform a reduction on the contralateral side for size symmetry down the road.     We discussed the following procedure:  LEFT needle localized lumpectomy    Plan for 6:30 radial incision.    She understands the risks of lumpectomy including bleeding, infection, seroma and 25% chance of positive margins.    NCCN guidelines have been followed.    She will receive a recommendation for radiation after surgery.  She prefers to have adjuvant therapy in [place]  with Dr. David Mirza and with Dr. Paniagua.    She does see Dr. Lucia for her ulcerative colitis, and we will call him to discuss the implications on healing of the simponi weekly injections and the mesalamine.    We discussed her family history as well as her personal history of breast and cervical cancer and she would like to talk with genetics in more detail about this. We discussed her family history and that testing an affected member is ore useful than testing a nonaffected, as her daughter was interested in pursuing testing, due to mom and aunt having breast cancer.   We will arrange for her to talk to our genetics colleagues.    She has a reported history of postoperative nausea and vomiting, which we will remember on the day of surgery.    Anne Harvey MD    - Addendum- Dr Lucia said to keep her on the mesalamine but to stop the TNF inhibitor simponi one month prior. We will call.    Next Appointment:  Return for surgery.      EMR Dragon/transcription disclaimer:    Much of this encounter note is an electronic transcription/translocation of spoken language to printed text.  The electronic translation of spoken language may permit erroneous, or at times, nonsensical words or phrases to be inadvertently transcribed.  Although I have reviewed the note from such areas, some may still exist.

## 2017-12-19 ENCOUNTER — TELEPHONE (OUTPATIENT)
Dept: SURGERY | Facility: CLINIC | Age: 62
End: 2017-12-19

## 2017-12-19 ENCOUNTER — PREP FOR SURGERY (OUTPATIENT)
Dept: OTHER | Facility: HOSPITAL | Age: 62
End: 2017-12-19

## 2017-12-19 ENCOUNTER — TRANSCRIBE ORDERS (OUTPATIENT)
Dept: SURGERY | Facility: CLINIC | Age: 62
End: 2017-12-19

## 2017-12-19 DIAGNOSIS — D05.12 INTRADUCTAL CARCINOMA IN SITU OF LEFT BREAST: Primary | ICD-10-CM

## 2017-12-19 NOTE — TELEPHONE ENCOUNTER
Spoke with Dr. Lucia and he recommended holding the December 22 dose of simple and he.  Her last dose was November 24, 2017, this is a monthly injection, therefore if she holds December 22 dose she will have been off of that medication for over 4 weeks. Her case is scheduled for 1-11-18.

## 2017-12-19 NOTE — TELEPHONE ENCOUNTER
Scheduled Surgery LUIS ENRIQUE 1-11-18  Left Breast Needle Localized Lumpectomy      Arrive       @ 9:00  NL            @ 11:00  Surgery   @ 12:30    PAT 1-5-18 @ 11:30  Return to see Dr MELO 1-24-18 arrive 10:15    Spoke with patient's   They are aware of all appointments  leda

## 2017-12-19 NOTE — TELEPHONE ENCOUNTER
I called to let her know NOT take the next dose of simponi    Dr. Lucia confirmed ok to continue the mesalamine, but NOT to take the Dec 22 simponi. I confirmed with patient.     I did thank them both for the information regarding family hx and genetics and let them know we'd recommend she talk w/the genetic counselors. They are in agreement and this will be arranged for her.     baldomero

## 2017-12-20 ENCOUNTER — TELEPHONE (OUTPATIENT)
Dept: SURGERY | Facility: CLINIC | Age: 62
End: 2017-12-20

## 2017-12-20 ENCOUNTER — TRANSCRIBE ORDERS (OUTPATIENT)
Dept: SURGERY | Facility: CLINIC | Age: 62
End: 2017-12-20

## 2017-12-20 DIAGNOSIS — D05.12 DUCTAL CARCINOMA IN SITU OF LEFT BREAST: Primary | ICD-10-CM

## 2017-12-26 ENCOUNTER — CLINICAL SUPPORT (OUTPATIENT)
Dept: OTHER | Facility: HOSPITAL | Age: 62
End: 2017-12-26
Attending: SURGERY

## 2017-12-26 DIAGNOSIS — D05.12 INTRADUCTAL CARCINOMA IN SITU OF LEFT BREAST: Primary | ICD-10-CM

## 2017-12-26 PROCEDURE — G0463 HOSPITAL OUTPT CLINIC VISIT: HCPCS

## 2017-12-26 NOTE — PROGRESS NOTES
Labs drawn per left wrist area vein using 21 gauge butterfly. 2 lavender tubes given to Dr. Jose Perez's nurse to send to [facility] labs. Sterile 2x2 applied.

## 2018-01-04 ENCOUNTER — TELEPHONE (OUTPATIENT)
Dept: GASTROENTEROLOGY | Facility: CLINIC | Age: 63
End: 2018-01-04

## 2018-01-04 NOTE — TELEPHONE ENCOUNTER
----- Message from Joni Lucia MD sent at 1/1/2018  6:03 PM EST -----  Tell her that her repeat LFT's (on 12/13/17) and hepatitis profile all came back normal. I hope that she is doing well and that her ulcerative colitis is still quiet? stone

## 2018-01-04 NOTE — TELEPHONE ENCOUNTER
Called pt and advised per Dr Lucia that her repeat lft's on 12/13/2017 and hepatitis profile all came back normal.  He hopes she is doing well and her uc is still quiet.     Pt verb understanding and reports she is doing very well.  She reports that next week she has her lumpectomy surgery for her breast cancer. Advised would update Dr Lucia.

## 2018-01-05 ENCOUNTER — APPOINTMENT (OUTPATIENT)
Dept: PREADMISSION TESTING | Facility: HOSPITAL | Age: 63
End: 2018-01-05

## 2018-01-05 ENCOUNTER — TELEPHONE (OUTPATIENT)
Dept: GASTROENTEROLOGY | Facility: CLINIC | Age: 63
End: 2018-01-05

## 2018-01-05 VITALS
WEIGHT: 121 LBS | HEIGHT: 61 IN | TEMPERATURE: 98 F | BODY MASS INDEX: 22.84 KG/M2 | SYSTOLIC BLOOD PRESSURE: 137 MMHG | RESPIRATION RATE: 16 BRPM | HEART RATE: 70 BPM | DIASTOLIC BLOOD PRESSURE: 90 MMHG | OXYGEN SATURATION: 98 %

## 2018-01-05 LAB
DEPRECATED RDW RBC AUTO: 45.5 FL (ref 37–54)
ERYTHROCYTE [DISTWIDTH] IN BLOOD BY AUTOMATED COUNT: 13.2 % (ref 11.7–13)
HCT VFR BLD AUTO: 41.8 % (ref 35.6–45.5)
HGB BLD-MCNC: 14.4 G/DL (ref 11.9–15.5)
MCH RBC QN AUTO: 32.6 PG (ref 26.9–32)
MCHC RBC AUTO-ENTMCNC: 34.4 G/DL (ref 32.4–36.3)
MCV RBC AUTO: 94.6 FL (ref 80.5–98.2)
PLATELET # BLD AUTO: 314 10*3/MM3 (ref 140–500)
PMV BLD AUTO: 9.2 FL (ref 6–12)
RBC # BLD AUTO: 4.42 10*6/MM3 (ref 3.9–5.2)
WBC NRBC COR # BLD: 7.56 10*3/MM3 (ref 4.5–10.7)

## 2018-01-05 PROCEDURE — 36415 COLL VENOUS BLD VENIPUNCTURE: CPT

## 2018-01-05 PROCEDURE — 93005 ELECTROCARDIOGRAM TRACING: CPT

## 2018-01-05 PROCEDURE — 93010 ELECTROCARDIOGRAM REPORT: CPT | Performed by: INTERNAL MEDICINE

## 2018-01-05 PROCEDURE — 85027 COMPLETE CBC AUTOMATED: CPT | Performed by: SURGERY

## 2018-01-05 NOTE — TELEPHONE ENCOUNTER
Faxed request received from [employer] for PA on Simponi.      Call to spouse, Jorge (see HIPAA auth of 3/23/16).  He states pt is on a one or two month break from Simponi while being tx for breast ca, but would like to proceed with PA so that no interruption in service when ready to resume.    Form initiated and to DR Lucia.

## 2018-01-05 NOTE — DISCHARGE INSTRUCTIONS
SURGERY 1-11-18  ARRIVAL TIME 9:00    Take the following medications the morning of surgery with a small sip of water:    NONE    General Instructions:  • Do not eat solid food after midnight the night before surgery.  • You may drink clear liquids day of surgery but must stop at least one hour before your hospital arrival time.  • It is beneficial for you to have a clear drink that contains carbohydrates the day of surgery.  We suggest a 12 to 20 ounce bottle of Gatorade or Powerade for non-diabetic patients or a 12 to 20 ounce bottle of G2 or Powerade Zero for diabetic patients. (Pediatric patients, are not advised to drink a 12 to 20 ounce carbohydrate drink)    Clear liquids are liquids you can see through.  Nothing red in color.     Plain water                               Sports drinks  Sodas                                   Gelatin (Jell-O)  Fruit juices without pulp such as white grape juice and apple juice  Popsicles that contain no fruit or yogurt  Tea or coffee (no cream or milk added)  Gatorade / Powerade  G2 / Powerade Zero    • Infants may have breast milk up to four hours before surgery.  • Infants drinking formula may drink formula up to six hours before surgery.   • Patients who avoid smoking, chewing tobacco and alcohol for 4 weeks prior to surgery have a reduced risk of post-operative complications.  Quit smoking as many days before surgery as you can.  • Do not smoke, use chewing tobacco or drink alcohol the day of surgery.   • If applicable bring your C-PAP/ BI-PAP machine.  • Bring any papers given to you in the doctor’s office.  • Wear clean comfortable clothes and socks.  • Do not wear contact lenses or make-up.  Bring a case for your glasses.   • Bring crutches or walker if applicable.  • Remove all piercings.  Leave jewelry and any other valuables at home.  • Hair extensions with metal clips must be removed prior to surgery.  • The Pre-Admission Testing nurse will instruct you to bring  medications if unable to obtain an accurate list in Pre-Admission Testing.        If you were given a blood bank ID arm band remember to bring it with you the day of surgery.    Preventing a Surgical Site Infection:  • For 2 to 3 days before surgery, avoid shaving with a razor because the razor can irritate skin and make it easier to develop an infection.  • The night prior to surgery sleep in a clean bed with clean clothing.  Do not allow pets to sleep with you.  • Shower on the morning of surgery using a fresh bar of anti-bacterial soap (such as Dial) and clean washcloth.  Dry with a clean towel and dress in clean clothing.  • Ask your surgeon if you will be receiving antibiotics prior to surgery.  • Make sure you, your family, and all healthcare providers clean their hands with soap and water or an alcohol based hand  before caring for you or your wound.    Day of surgery:  Upon arrival, a Pre-op nurse and Anesthesiologist will review your health history, obtain vital signs, and answer questions you may have.  The only belongings needed at this time will be your home medications and if applicable your C-PAP/BI-PAP machine.  If you are staying overnight your family can leave the rest of your belongings in the car and bring them to your room later.  A Pre-op nurse will start an IV and you may receive medication in preparation for surgery, including something to help you relax.  Your family will be able to see you in the Pre-op area.  While you are in surgery your family should notify the waiting room  if they leave the waiting room area and provide a contact phone number.    Please be aware that surgery does come with discomfort.  We want to make every effort to control your discomfort so please discuss any uncontrolled symptoms with your nurse.   Your doctor will most likely have prescribed pain medications.      If you are going home after surgery you will receive individualized written care  instructions before being discharged.  A responsible adult must drive you to and from the hospital on the day of your surgery and stay with you for 24 hours.    If you are staying overnight following surgery, you will be transported to your hospital room following the recovery period.  [facility] has all private rooms.    If you have any questions please call Pre-Admission Testing at [phone].  Deductibles and co-payments are collected on the day of service. Please be prepared to pay the required co-pay, deductible or deposit on the day of service as defined by your plan.

## 2018-01-08 ENCOUNTER — TELEPHONE (OUTPATIENT)
Dept: GASTROENTEROLOGY | Facility: CLINIC | Age: 63
End: 2018-01-08

## 2018-01-08 NOTE — TELEPHONE ENCOUNTER
----- Message from Amber Sesay sent at 1/8/2018  3:13 PM EST -----  Regarding: Prescription Question  Contact: [phone]  We just found out that [facility] does not accept our new Health Insurance, [employer].  Please send any new or existing prescriptions to [id number], [address] [place], [place] [address].    Thank you,    Amber Sesay

## 2018-01-08 NOTE — TELEPHONE ENCOUNTER
Pts preferred pharmacy changed to the [facility] in [facility] , [place]>

## 2018-01-09 ENCOUNTER — TELEPHONE (OUTPATIENT)
Dept: SURGERY | Facility: CLINIC | Age: 63
End: 2018-01-09

## 2018-01-11 ENCOUNTER — ANESTHESIA EVENT (OUTPATIENT)
Dept: PERIOP | Facility: HOSPITAL | Age: 63
End: 2018-01-11

## 2018-01-11 ENCOUNTER — ANESTHESIA (OUTPATIENT)
Dept: PERIOP | Facility: HOSPITAL | Age: 63
End: 2018-01-11

## 2018-01-11 ENCOUNTER — APPOINTMENT (OUTPATIENT)
Dept: GENERAL RADIOLOGY | Facility: HOSPITAL | Age: 63
End: 2018-01-11

## 2018-01-11 ENCOUNTER — HOSPITAL ENCOUNTER (OUTPATIENT)
Facility: HOSPITAL | Age: 63
Setting detail: HOSPITAL OUTPATIENT SURGERY
Discharge: HOME | End: 2018-01-11
Attending: SURGERY | Admitting: SURGERY

## 2018-01-11 ENCOUNTER — PREP FOR SURGERY (OUTPATIENT)
Dept: OTHER | Facility: HOSPITAL | Age: 63
End: 2018-01-11

## 2018-01-11 ENCOUNTER — HOSPITAL ENCOUNTER (OUTPATIENT)
Dept: MAMMOGRAPHY | Facility: HOSPITAL | Age: 63
Discharge: HOME | End: 2018-01-11
Attending: SURGERY

## 2018-01-11 VITALS
HEART RATE: 76 BPM | RESPIRATION RATE: 16 BRPM | SYSTOLIC BLOOD PRESSURE: 133 MMHG | DIASTOLIC BLOOD PRESSURE: 72 MMHG | TEMPERATURE: 98 F | OXYGEN SATURATION: 96 %

## 2018-01-11 DIAGNOSIS — D05.12 INTRADUCTAL CARCINOMA IN SITU OF LEFT BREAST: ICD-10-CM

## 2018-01-11 DIAGNOSIS — D05.12 BREAST NEOPLASM, TIS (DCIS), LEFT: ICD-10-CM

## 2018-01-11 PROCEDURE — 88307 TISSUE EXAM BY PATHOLOGIST: CPT | Performed by: SURGERY

## 2018-01-11 PROCEDURE — 25010000002 DEXAMETHASONE PER 1 MG: Performed by: NURSE ANESTHETIST, CERTIFIED REGISTERED

## 2018-01-11 PROCEDURE — 25010000002 FENTANYL CITRATE (PF) 100 MCG/2ML SOLUTION: Performed by: NURSE ANESTHETIST, CERTIFIED REGISTERED

## 2018-01-11 PROCEDURE — 19301 PARTIAL MASTECTOMY: CPT | Performed by: SURGERY

## 2018-01-11 PROCEDURE — 25010000002 FENTANYL CITRATE (PF) 100 MCG/2ML SOLUTION: Performed by: ANESTHESIOLOGY

## 2018-01-11 PROCEDURE — 25010000002 ONDANSETRON PER 1 MG: Performed by: NURSE ANESTHETIST, CERTIFIED REGISTERED

## 2018-01-11 PROCEDURE — 76098 X-RAY EXAM SURGICAL SPECIMEN: CPT

## 2018-01-11 PROCEDURE — 25010000002 PROPOFOL 10 MG/ML EMULSION: Performed by: NURSE ANESTHETIST, CERTIFIED REGISTERED

## 2018-01-11 RX ORDER — LIDOCAINE HYDROCHLORIDE 10 MG/ML
3 INJECTION, SOLUTION INFILTRATION; PERINEURAL ONCE
Status: COMPLETED | OUTPATIENT
Start: 2018-01-11 | End: 2018-01-11

## 2018-01-11 RX ORDER — DIPHENHYDRAMINE HYDROCHLORIDE 50 MG/ML
6.25 INJECTION INTRAMUSCULAR; INTRAVENOUS
Status: DISCONTINUED | OUTPATIENT
Start: 2018-01-11 | End: 2018-01-11 | Stop reason: HOSPADM

## 2018-01-11 RX ORDER — FLUMAZENIL 0.1 MG/ML
0.2 INJECTION INTRAVENOUS AS NEEDED
Status: DISCONTINUED | OUTPATIENT
Start: 2018-01-11 | End: 2018-01-11 | Stop reason: HOSPADM

## 2018-01-11 RX ORDER — HYDRALAZINE HYDROCHLORIDE 20 MG/ML
5 INJECTION INTRAMUSCULAR; INTRAVENOUS
Status: DISCONTINUED | OUTPATIENT
Start: 2018-01-11 | End: 2018-01-11 | Stop reason: HOSPADM

## 2018-01-11 RX ORDER — FENTANYL CITRATE 50 UG/ML
100 INJECTION, SOLUTION INTRAMUSCULAR; INTRAVENOUS
Status: DISCONTINUED | OUTPATIENT
Start: 2018-01-11 | End: 2018-01-11 | Stop reason: HOSPADM

## 2018-01-11 RX ORDER — MIDAZOLAM HYDROCHLORIDE 1 MG/ML
2 INJECTION INTRAMUSCULAR; INTRAVENOUS
Status: DISCONTINUED | OUTPATIENT
Start: 2018-01-11 | End: 2018-01-11 | Stop reason: HOSPADM

## 2018-01-11 RX ORDER — FENTANYL CITRATE 50 UG/ML
50 INJECTION, SOLUTION INTRAMUSCULAR; INTRAVENOUS
Status: DISCONTINUED | OUTPATIENT
Start: 2018-01-11 | End: 2018-01-11 | Stop reason: HOSPADM

## 2018-01-11 RX ORDER — LIDOCAINE HYDROCHLORIDE 20 MG/ML
INJECTION, SOLUTION INFILTRATION; PERINEURAL AS NEEDED
Status: DISCONTINUED | OUTPATIENT
Start: 2018-01-11 | End: 2018-01-11 | Stop reason: SURG

## 2018-01-11 RX ORDER — PROMETHAZINE HYDROCHLORIDE 25 MG/1
25 TABLET ORAL ONCE AS NEEDED
Status: DISCONTINUED | OUTPATIENT
Start: 2018-01-11 | End: 2018-01-11 | Stop reason: HOSPADM

## 2018-01-11 RX ORDER — FENTANYL CITRATE 50 UG/ML
INJECTION, SOLUTION INTRAMUSCULAR; INTRAVENOUS AS NEEDED
Status: DISCONTINUED | OUTPATIENT
Start: 2018-01-11 | End: 2018-01-11 | Stop reason: SURG

## 2018-01-11 RX ORDER — SODIUM CHLORIDE, SODIUM LACTATE, POTASSIUM CHLORIDE, CALCIUM CHLORIDE 600; 310; 30; 20 MG/100ML; MG/100ML; MG/100ML; MG/100ML
100 INJECTION, SOLUTION INTRAVENOUS CONTINUOUS
Status: DISCONTINUED | OUTPATIENT
Start: 2018-01-11 | End: 2018-01-11 | Stop reason: HOSPADM

## 2018-01-11 RX ORDER — HYDROCODONE BITARTRATE AND ACETAMINOPHEN 7.5; 325 MG/1; MG/1
1 TABLET ORAL ONCE AS NEEDED
Status: COMPLETED | OUTPATIENT
Start: 2018-01-11 | End: 2018-01-11

## 2018-01-11 RX ORDER — PROMETHAZINE HYDROCHLORIDE 25 MG/ML
6.25 INJECTION, SOLUTION INTRAMUSCULAR; INTRAVENOUS ONCE AS NEEDED
Status: DISCONTINUED | OUTPATIENT
Start: 2018-01-11 | End: 2018-01-11 | Stop reason: HOSPADM

## 2018-01-11 RX ORDER — MIDAZOLAM HYDROCHLORIDE 1 MG/ML
1 INJECTION INTRAMUSCULAR; INTRAVENOUS
Status: DISCONTINUED | OUTPATIENT
Start: 2018-01-11 | End: 2018-01-11 | Stop reason: HOSPADM

## 2018-01-11 RX ORDER — LIDOCAINE HYDROCHLORIDE 10 MG/ML
0.5 INJECTION, SOLUTION EPIDURAL; INFILTRATION; INTRACAUDAL; PERINEURAL ONCE AS NEEDED
Status: COMPLETED | OUTPATIENT
Start: 2018-01-11 | End: 2018-01-11

## 2018-01-11 RX ORDER — DEXAMETHASONE SODIUM PHOSPHATE 10 MG/ML
INJECTION INTRAMUSCULAR; INTRAVENOUS AS NEEDED
Status: DISCONTINUED | OUTPATIENT
Start: 2018-01-11 | End: 2018-01-11 | Stop reason: SURG

## 2018-01-11 RX ORDER — SCOLOPAMINE TRANSDERMAL SYSTEM 1 MG/1
1 PATCH, EXTENDED RELEASE TRANSDERMAL ONCE
Status: DISCONTINUED | OUTPATIENT
Start: 2018-01-11 | End: 2018-01-11 | Stop reason: HOSPADM

## 2018-01-11 RX ORDER — SODIUM CHLORIDE 0.9 % (FLUSH) 0.9 %
1-10 SYRINGE (ML) INJECTION AS NEEDED
Status: DISCONTINUED | OUTPATIENT
Start: 2018-01-11 | End: 2018-01-11 | Stop reason: HOSPADM

## 2018-01-11 RX ORDER — PROMETHAZINE HYDROCHLORIDE 25 MG/1
25 SUPPOSITORY RECTAL ONCE AS NEEDED
Status: DISCONTINUED | OUTPATIENT
Start: 2018-01-11 | End: 2018-01-11 | Stop reason: HOSPADM

## 2018-01-11 RX ORDER — ONDANSETRON 2 MG/ML
INJECTION INTRAMUSCULAR; INTRAVENOUS AS NEEDED
Status: DISCONTINUED | OUTPATIENT
Start: 2018-01-11 | End: 2018-01-11 | Stop reason: SURG

## 2018-01-11 RX ORDER — ONDANSETRON 2 MG/ML
4 INJECTION INTRAMUSCULAR; INTRAVENOUS ONCE AS NEEDED
Status: DISCONTINUED | OUTPATIENT
Start: 2018-01-11 | End: 2018-01-11 | Stop reason: HOSPADM

## 2018-01-11 RX ORDER — OXYCODONE HYDROCHLORIDE AND ACETAMINOPHEN 5; 325 MG/1; MG/1
2 TABLET ORAL ONCE AS NEEDED
Status: DISCONTINUED | OUTPATIENT
Start: 2018-01-11 | End: 2018-01-11 | Stop reason: HOSPADM

## 2018-01-11 RX ORDER — FAMOTIDINE 10 MG/ML
20 INJECTION, SOLUTION INTRAVENOUS ONCE
Status: COMPLETED | OUTPATIENT
Start: 2018-01-11 | End: 2018-01-11

## 2018-01-11 RX ORDER — DIAZEPAM 5 MG/1
10 TABLET ORAL ONCE
Status: COMPLETED | OUTPATIENT
Start: 2018-01-11 | End: 2018-01-11

## 2018-01-11 RX ORDER — PROPOFOL 10 MG/ML
VIAL (ML) INTRAVENOUS AS NEEDED
Status: DISCONTINUED | OUTPATIENT
Start: 2018-01-11 | End: 2018-01-11 | Stop reason: SURG

## 2018-01-11 RX ORDER — SODIUM CHLORIDE, SODIUM LACTATE, POTASSIUM CHLORIDE, CALCIUM CHLORIDE 600; 310; 30; 20 MG/100ML; MG/100ML; MG/100ML; MG/100ML
9 INJECTION, SOLUTION INTRAVENOUS CONTINUOUS
Status: DISCONTINUED | OUTPATIENT
Start: 2018-01-11 | End: 2018-01-11 | Stop reason: HOSPADM

## 2018-01-11 RX ORDER — LABETALOL HYDROCHLORIDE 5 MG/ML
5 INJECTION, SOLUTION INTRAVENOUS
Status: DISCONTINUED | OUTPATIENT
Start: 2018-01-11 | End: 2018-01-11 | Stop reason: HOSPADM

## 2018-01-11 RX ORDER — EPHEDRINE SULFATE 50 MG/ML
5 INJECTION, SOLUTION INTRAVENOUS ONCE AS NEEDED
Status: DISCONTINUED | OUTPATIENT
Start: 2018-01-11 | End: 2018-01-11 | Stop reason: HOSPADM

## 2018-01-11 RX ADMIN — SODIUM CHLORIDE, POTASSIUM CHLORIDE, SODIUM LACTATE AND CALCIUM CHLORIDE 100 ML/HR: 600; 310; 30; 20 INJECTION, SOLUTION INTRAVENOUS at 11:57

## 2018-01-11 RX ADMIN — LIDOCAINE HYDROCHLORIDE 100 MG: 20 INJECTION, SOLUTION INFILTRATION; PERINEURAL at 13:21

## 2018-01-11 RX ADMIN — CEFAZOLIN SODIUM 2 G: 1 INJECTION, POWDER, FOR SOLUTION INTRAMUSCULAR; INTRAVENOUS at 13:18

## 2018-01-11 RX ADMIN — LIDOCAINE HYDROCHLORIDE 3 ML: 10 INJECTION, SOLUTION INFILTRATION; PERINEURAL at 11:20

## 2018-01-11 RX ADMIN — LIDOCAINE HYDROCHLORIDE 0.5 ML: 10 INJECTION, SOLUTION EPIDURAL; INFILTRATION; INTRACAUDAL; PERINEURAL at 09:47

## 2018-01-11 RX ADMIN — FAMOTIDINE 20 MG: 10 INJECTION, SOLUTION INTRAVENOUS at 10:28

## 2018-01-11 RX ADMIN — SODIUM CHLORIDE, POTASSIUM CHLORIDE, SODIUM LACTATE AND CALCIUM CHLORIDE: 600; 310; 30; 20 INJECTION, SOLUTION INTRAVENOUS at 08:59

## 2018-01-11 RX ADMIN — HYDROCODONE BITARTRATE AND ACETAMINOPHEN 1 TABLET: 7.5; 325 TABLET ORAL at 14:58

## 2018-01-11 RX ADMIN — FENTANYL CITRATE 50 MCG: 50 INJECTION, SOLUTION INTRAMUSCULAR; INTRAVENOUS at 15:07

## 2018-01-11 RX ADMIN — DIAZEPAM 10 MG: 5 TABLET ORAL at 10:30

## 2018-01-11 RX ADMIN — DEXAMETHASONE SODIUM PHOSPHATE 8 MG: 10 INJECTION INTRAMUSCULAR; INTRAVENOUS at 13:32

## 2018-01-11 RX ADMIN — FENTANYL CITRATE 25 MCG: 50 INJECTION INTRAMUSCULAR; INTRAVENOUS at 13:48

## 2018-01-11 RX ADMIN — PROPOFOL 200 MG: 10 INJECTION, EMULSION INTRAVENOUS at 13:21

## 2018-01-11 RX ADMIN — SCOPALAMINE 1 PATCH: 1 PATCH, EXTENDED RELEASE TRANSDERMAL at 10:12

## 2018-01-11 RX ADMIN — FENTANYL CITRATE 50 MCG: 50 INJECTION INTRAMUSCULAR; INTRAVENOUS at 13:21

## 2018-01-11 RX ADMIN — SODIUM CHLORIDE, POTASSIUM CHLORIDE, SODIUM LACTATE AND CALCIUM CHLORIDE 9 ML/HR: 600; 310; 30; 20 INJECTION, SOLUTION INTRAVENOUS at 15:24

## 2018-01-11 RX ADMIN — ONDANSETRON 4 MG: 2 INJECTION INTRAMUSCULAR; INTRAVENOUS at 13:55

## 2018-01-11 NOTE — PLAN OF CARE
Problem: Perioperative Period (Adult)  Goal: Signs and Symptoms of Listed Potential Problems Will be Absent or Manageable (Perioperative Period)  Outcome: Outcome(s) achieved Date Met: 01/11/18 01/11/18 1606   Perioperative Period   Problems Assessed (Perioperative Period) all   Problems Present (Perioperative Period) pain

## 2018-01-11 NOTE — PLAN OF CARE
Problem: Patient Care Overview (Adult)  Goal: Plan of Care Review  Outcome: Outcome(s) achieved Date Met: 01/11/18 01/11/18 1601   Coping/Psychosocial Response Interventions   Plan Of Care Reviewed With patient;spouse   Patient Care Overview   Progress improving   Outcome Evaluation   Outcome Summary/Follow up Plan TOLERATING ORAL INTAKE. ALET AND ORIENTED. DISCHARGE TEACHING COMPLETED     Goal: Adult Individualization and Mutuality  Outcome: Outcome(s) achieved Date Met: 01/11/18 01/11/18 1601   Individualization   Patient Specific Interventions MINIMAL OPSITE DISCOMFORT. C/O HEADACHE. MEDICATED AS NOTED IN PACU     Goal: Discharge Needs Assessment  Outcome: Outcome(s) achieved Date Met: 01/11/18 01/11/18 1601   Discharge Needs Assessment   Concerns To Be Addressed no discharge needs identified

## 2018-01-11 NOTE — ANESTHESIA PREPROCEDURE EVALUATION
Anesthesia Evaluation     Patient summary reviewed and Nursing notes reviewed   history of anesthetic complications: PONV  NPO Solid Status: > 8 hours       Airway   Mallampati: II  TM distance: >3 FB  Neck ROM: full  no difficulty expected  Dental - normal exam     Pulmonary - negative pulmonary ROS and normal exam   Cardiovascular - negative cardio ROS and normal exam        Neuro/Psych- negative ROS  GI/Hepatic/Renal/Endo - negative ROS     Musculoskeletal (-) negative ROS    Abdominal  - normal exam   Substance History - negative use     OB/GYN negative ob/gyn ROS         Other                                                Anesthesia Plan    ASA 2     general     intravenous induction   Anesthetic plan and risks discussed with patient.    Plan discussed with CRNA.

## 2018-01-11 NOTE — OP NOTE
Operative note    Preoperative Diagnosis: DCIS    Postoperative Diagnosis: DCIS    Procedure Performed: left needle localized lumpectomy     Dictating physician and surgeon: Anne Harvey MD    EBL:1cc    FINDINGS AND DESCRIPTION OF PROCEDURE:     The patient was brought to the operating room and placed on the table in the supine position. After adequate general LMA anesthesia was obtained, we sterilly prepped and draped the breast and axilla. We did a time out to identify correct patient and correct operative site.  She did receive IV antibiotic within an hour of and prior to incision.     I used the intraoperative localization films to examine the lesion of interest.  I marked the intended area for resection and planned my incision based on the mammographic localization imaging. The localization need was the following length: 3cm     I included an ellipse of skin overlying the tumor for margins and orientation. Radial 6:30 incision.    I used a 15 blade to incise the skin. I then dissected using bovie electrocautery superiorly, inferiorly, medially and laterally around the lesion.  I examined the specimen using the intraoperative faxitron to document the presence of the lesion within the specimen. The marker was centrally located in 2 images. I then took no additional margins.     I then passed the specimens and labelled as follows: For the lumpectomy,  long stitch lateral short stitch superior, double stitch deep.     I then irrigated, assured hemostasis.  I then mobilized the breast posteriorly and closed a deep layer of breast tissue using a running 2-0 vicryl, followed by a superficial layer of breast using a running 2-0 vicryl. I then closed the skin in 2 layers- the first being n interrupted 3-0 vicryl layer, followed by a running 4-0 monocryl.    I then applied lengthwise 1/2 inch steri strips, an island dressing.  Then I wrapped her in 4x4s and a 6 inch ACE wrap.    She tolerated the procedure well,  there were no immediate complications, and all counts were correct at the end of the case.

## 2018-01-11 NOTE — DISCHARGE INSTRUCTIONS
Dr. Anne Harvey  [address]  [phone]    Lumpectomy, [place] Node Biopsy, Excisional Breast Biopsy, Port Placement  Postoperative Discharge Instructions         Keep 6 inch ace wrap in place and dressings dry postoperatively for a total of 72 hours. May then remove dressings and ACE wrap. Can shower and get affected area wet after dressings are removed. Prefer no soaking in the tub for one week. Leave steri-strips in place.   A responsible adult should accompany the patient on discharge and for 24 hours following surgery.   No heavy lifting (>5 lbs) or strenuous upper arm activity, and no raising of arms over the head until followup appointment.   For 24 hours postoperatively, or while taking pain or nausea medications - Do not drive, operate machinery or drink alcohol.   Call the office for any of the following symptoms:    Persistent bleeding   Excessive bruising or swelling   Excessive sleepiness or trouble breathing   Severe pain   Persistent nausea or vomiting   Fever greater than 101.   Patient should keep the postoperative visit that was scheduled for him/her in [facility]. If the patient has forgotten this date, please call the clinic for a reminder of the appointment time. If it is after hours, the patient can call the clinic the next business day for this reminder. [facility] phone number is [phone].

## 2018-01-11 NOTE — PLAN OF CARE
Problem: Patient Care Overview (Adult)  Goal: Plan of Care Review  Outcome: Ongoing (interventions implemented as appropriate)   01/11/18 0921   Coping/Psychosocial Response Interventions   Plan Of Care Reviewed With patient   Patient Care Overview   Progress no change     Goal: Discharge Needs Assessment  Outcome: Ongoing (interventions implemented as appropriate)   01/11/18 0921   Discharge Needs Assessment   Concerns To Be Addressed denies needs/concerns at this time   Equipment Needed After Discharge none   Self-Care   Equipment Currently Used at Home none       Problem: Perioperative Period (Adult)  Goal: Signs and Symptoms of Listed Potential Problems Will be Absent or Manageable (Perioperative Period)  Outcome: Ongoing (interventions implemented as appropriate)   01/11/18 0921   Perioperative Period   Problems Assessed (Perioperative Period) all   Problems Present (Perioperative Period) pain

## 2018-01-11 NOTE — ANESTHESIA PROCEDURE NOTES
Airway  Urgency: elective    Airway not difficult    General Information and Staff    Patient location during procedure: OR  Anesthesiologist: TAY FAIR  CRNA: SELMA DEL CID    Indications and Patient Condition  Indications for airway management: airway protection    Preoxygenated: yes  MILS maintained throughout  Mask difficulty assessment: 1 - vent by mask    Final Airway Details  Final airway type: supraglottic airway      Successful airway: ProSeal  Size 4    Number of attempts at approach: 1    Additional Comments  Smooth iv induction with no complications

## 2018-01-11 NOTE — ANESTHESIA POSTPROCEDURE EVALUATION
Patient: Amber Sesay    Procedure Summary     Date Anesthesia Start Anesthesia Stop Room / Location    01/11/18 5055 1421  [facility]       Procedure Diagnosis Surgeon Provider    LEFT needle localized lumpectomy (Left Breast) Breast neoplasm, Tis (DCIS), left  (Breast neoplasm, Tis (DCIS), left [D05.12]) MD Jayden Sawant MD          Anesthesia Type: general  Last vitals  BP   132/74 (01/11/18 1425)   Temp   36.2 °C (97.2 °F) (01/11/18 1423)   Pulse   75 (01/11/18 1430)   Resp   16 (01/11/18 1430)     SpO2   96 % (01/11/18 1430)     Post Anesthesia Care and Evaluation    Patient location during evaluation: bedside  Patient participation: complete - patient participated  Level of consciousness: awake  Pain score: 1  Pain management: adequate  Airway patency: patent  Anesthetic complications: No anesthetic complications    Cardiovascular status: acceptable  Respiratory status: acceptable  Hydration status: acceptable    Comments: --------------------            01/11/18 1430     --------------------   BP:                  Pulse:      75       Resp:       16       Temp:                SpO2:      96%      --------------------

## 2018-01-11 NOTE — PLAN OF CARE
Problem: Perioperative Period (Adult)  Goal: Signs and Symptoms of Listed Potential Problems Will be Absent or Manageable (Perioperative Period)  Outcome: Ongoing (interventions implemented as appropriate)   01/11/18 1519   Perioperative Period   Problems Assessed (Perioperative Period) all   Problems Present (Perioperative Period) none

## 2018-01-11 NOTE — PLAN OF CARE
Problem: Patient Care Overview (Adult)  Goal: Plan of Care Review  Outcome: Ongoing (interventions implemented as appropriate)   01/11/18 5452   Coping/Psychosocial Response Interventions   Plan Of Care Reviewed With patient   Patient Care Overview   Progress improving

## 2018-01-12 NOTE — TELEPHONE ENCOUNTER
Faxed note received from "[employer] [facility] that PA has been denied for Simponi due to trial of one of the two preferred biologics.      Call to [facility] @ [phone] ext [phone] - message left for Abelino challenging denial of Simponi - pt has been on for four years.  Awaiting reply.

## 2018-01-15 ENCOUNTER — TELEPHONE (OUTPATIENT)
Dept: SURGERY | Facility: CLINIC | Age: 63
End: 2018-01-15

## 2018-01-15 DIAGNOSIS — D05.10 DUCTAL CARCINOMA IN SITU (DCIS) OF BREAST, UNSPECIFIED LATERALITY: Primary | ICD-10-CM

## 2018-01-15 NOTE — TELEPHONE ENCOUNTER
Appeal letter and all office notes faxed to  - confirmation received.    Call to [facility] - message left for Abelino batres that are pursuing appeal.

## 2018-01-15 NOTE — TELEPHONE ENCOUNTER
1-11-7 lumpectomy for DCIS returned as 8 mm of low-grade ductal carcinoma in situ.  Margins are uninvolved.  Distance from closest margin is less than a half millimeter laterally.  Discuss this case with Dr. Ivett Smalls due to the 100% hormone fed nature of the tumor and low grade, she did not feel that reexcision was necessary laterally.    I will call and let patient know.    I will arrange for her to see medical oncology and radiation oncology.      Final Diagnosis     LEFT BREAST LUMPECTOMY:                         DUCT CARCINOMA IN SITU, SOLID, LOW GRADE WITH ABSENCE OF NECROSIS.                          FOCALLY, DUCT CARCINOMA IN-SITU IS LESS THAN  0.5 MM FROM LATERAL MARGIN.         The following synoptic report utilizes the June 2017 CAP protocol for duct carcinoma in situ.     Procedure: Excision.  Specimen laterality: Left.  Size (extent) of DCIS: Estimated extent of DCIS is at least 8 mm.                         Comment: This measurement is based on the presence of DCIS present focally on slides from two blocks, each                         with estimated thickness of 4 mm.   Histologic type: Duct carcinoma in situ.  Nuclear grade: Grade 1 (low).  Necrosis: Not identified.  Margins: Uninvolved by duct carcinoma in situ.                         Distance from closest margin: Less than 0.5 mm from lateral margin.  Regional lymph nodes: No lymph nodes submitted or found.  Pathologic staging:                         Primary tumor: pTis(DCIS).      CMK/mirna/brb/mirna  IHC/TDJ/THM

## 2018-01-15 NOTE — TELEPHONE ENCOUNTER
Call from Abelino Camacho at [facility].  She states that best course is to appeal, and if again denied - notify [facility].  Pt could be enrolled in Links program and receive Simponi for free.    Confer with DR Lucia - proceed with appeal.   Letter prepared and to DR Lucia for review.    Call to pt to advise that Simponi denied.  Working on appeal - will keep posted.  Pt verb understanding.

## 2018-01-16 ENCOUNTER — TELEPHONE (OUTPATIENT)
Dept: SURGERY | Facility: CLINIC | Age: 63
End: 2018-01-16

## 2018-01-16 NOTE — TELEPHONE ENCOUNTER
Call received from Maritza at [facility] requesting refill.  Advise that PA pending.  Maritza verb understanding.

## 2018-01-19 NOTE — TELEPHONE ENCOUNTER
Call to [facility] - message left for Abelino Camacho requesting status of appeal.  Awaiting reply.

## 2018-01-19 NOTE — TELEPHONE ENCOUNTER
Attempt return call to Abelino Camacho with [facility] - message left with request to contact office.

## 2018-01-23 NOTE — TELEPHONE ENCOUNTER
Form received - requires pt signature.  Call to spouse, Jorge, to check if may sign for pt.  Jorge states to fax to home # - will sign and send back.    Faxed as requested - confirmation received.

## 2018-01-23 NOTE — TELEPHONE ENCOUNTER
Call to Abelino at [facility].  She states must first have [facility] Enrollment form for pt before can proceed with PA.  Will fax said form to .

## 2018-01-24 ENCOUNTER — OFFICE VISIT (OUTPATIENT)
Dept: SURGERY | Facility: CLINIC | Age: 63
End: 2018-01-24

## 2018-01-24 VITALS
DIASTOLIC BLOOD PRESSURE: 88 MMHG | WEIGHT: 127 LBS | HEIGHT: 61 IN | SYSTOLIC BLOOD PRESSURE: 119 MMHG | OXYGEN SATURATION: 97 % | BODY MASS INDEX: 23.98 KG/M2 | HEART RATE: 78 BPM

## 2018-01-24 DIAGNOSIS — D05.12 DUCTAL CARCINOMA IN SITU (DCIS) OF LEFT BREAST: Primary | ICD-10-CM

## 2018-01-24 PROCEDURE — 99024 POSTOP FOLLOW-UP VISIT: CPT | Performed by: SURGERY

## 2018-01-24 NOTE — PROGRESS NOTES
Chief Complaint: Amber Thrasher is a 62 y.o. female who was seen in consultation at the request of Dora Ross MD  for newly diagnosed breast cancer and a postoperative visit    History of Present Illness:  Patient presents with newly diagnosed DCIS, . She noted no new masses, skin changes, nipple discharge, nipple changes prior to her most recent imaging.    Her most recent imaging includes the following:   10/26/16  [facility]      MAMMO/ SCREENING MAMMO AMBER THRASHER  Heterogeneously dense.  BIRADS category 1. Negative     10/31/2017     [facility]  MAMMO SCREENING  AMBER THRASHER  There is a new group of microcalcifications lower inner left breast 3-4 cm from the nipple.   BIRADS category 0.    11/16/2017 [facility]     MAMMO/DIAG UNT LT DIGITAL AMBER THRASHER  Group of pleomorphic microcalcifications lower inner left breast 6:00 to 7:00 3-4 cm from the nipple.  BIRADS category 4        She had a biopsy on the following day that showed:   11/27/17 [facility]     MAMMO/STERIOTACTIC LOCALIZE LT     AMBER THRASHER  9 gauge Eviva.  18 core biopsy.  Tissue marker was deployed.  Post procedure mammography showed the tissue marker to be at the target site.     11/27/17 [facility] PATHOLOGY REPORT AMBER THRASHER  1. Left breast lower inner quadrant at 3-4 cm from nipple: clinically with microcalcifications:  ductal carcinoma in situ, solid and cribriform types, low grade.  Negative for invasive malignancy.    2. Left breast, lower inner quadrant at 3-4 cm from nipple; clinically tissue without microcalcifications:  single focus of ductal carcinoma in situ, solid type with microcalcification identified.    11/27/17 [facility]  HORMONE RECEPTORS  AMBER THRASHER  Estrogen  100   Progesterone   100       I then arranged for a breast MRI:  12/07/17 [facility] BREAST MRI  AMBER THRASHER  Left breast 6’oclock the order of 5cm from the nipple there is a  metallic clip within a postbiopsy cavity.  There are some thin linear enhancement seen along the anterior and posterior margins of the cavity.  The cavity and associated thin linear enhancement measured 2.2 cm in anterior to posterior dimension, 1.5 cm in the superior to inferior dimension and 2.9 cm in the mediolateral dimension.  There are no other areas of abnormal enhancement in the left breast.  No evidence for left axillary adenopathy.  There are no areas of abnormal enhancement or morphology in the right breast.    She has not had a breast biopsy in the past.  She has her ovaries, she had her uterus removed in 1990 for cervical cancer.    Interval History:  Invitae panel test 1-8-18 negative for mutation.    1-11-7 lumpectomy for DCIS returned as 8 mm of low-grade ductal carcinoma in situ.  Margins are uninvolved.  Distance from closest margin is less than a half millimeter laterally.    Discussed this case with Dr. Ivett Smalls due to the 100% hormone fed nature of the tumor and low grade, she did not feel that reexcision was necessary laterally.    Denies any redness warmth or drainage from the incision.    Review of Systems:  Review of Systems   Constitutional: Positive for unexpected weight change (1 LB WT GAIN).   All other systems reviewed and are negative.       Past Medical and Surgical History:  Breast Biopsy History:  Patient had not had a breast biopsy prior to her cancer diagnosis.  Breast Cancer HIstory:  Patient does not have a past medical history of breast cancer.  Breast Operations, and year:  None   Obstetric/Gynecologic History:  Age menstrual periods began:teens   Patient is postmenopausal due to removal of her uterus in the following year:1990   Number of pregnancies:3  Number of live births: 2  Number of abortions or miscarriages: 1  Age of delivery of first child: 22  Patient did not breast feed.  Length of time taking birth control pills: 7 yrs   Patient has never taken hormone  replacement  Patient had uterus removed 1990    Past Surgical History:   Procedure Laterality Date   • BREAST BIOPSY     • BREAST LUMPECTOMY Left 1/11/2018    Procedure: LEFT needle localized lumpectomy;  Surgeon: Anne Harvey MD;  [place] [facility] [place] [facility] [place]:    • COLONOSCOPY  03/20/2015    MILD TO MODERATE PANCOLITIS W/SCARRING THROUGHOUT COLON, IH, ACUTE CRYPTITIS, ACTIVE COLITIS   • COLONOSCOPY N/A 4/25/2017    Lots of scarring throughout the colon from her long h/o of ulcerative colitis, mild left sided ulcerative colitis, IH.  PATH: mild chronic active colitis.     • ENDOSCOPY  03/20/2015    ERYTHEMATOUS MUCOSA IN STOMACH, LEIOMYOMA, REACTIVE MUCOSAL CHANGES, CHRONIC INFLAMMATION   • HYSTERECTOMY     • UPPER GASTROINTESTINAL ENDOSCOPY  03/20/2015    COMPLETED BY DR. MATT FIGUEROA       Past Medical History:   Diagnosis Date   • Anxiety    • Breast cancer     LEFT 2017   • Cervical cancer 1990    LEFT BREAST 2017   • Chronic pancolonic ulcerative colitis     DIAGNOSED 25 YEARS AGO   • History of transfusion    • PONV (postoperative nausea and vomiting)        Prior Hospitalizations, other than for surgery or childbirth, and year:  Ulcerative colitis-Anemic     Social History     Social History   • Marital status:      Spouse name: N/A   • Number of children: N/A   • Years of education: N/A     Occupational History   • Not on file.     Social History Main Topics   • Smoking status: Never Smoker   • Smokeless tobacco: Never Used   • Alcohol use Yes      Comment: on occ   • Drug use: No   • Sexual activity: Not on file     Other Topics Concern   • Not on file     Social History Narrative     Patient is .  Patient is unemployed.  Patient drinks 2 servings of caffeine per day.    Family History:  Family History   Problem Relation Age of Onset   • Liver cancer Mother    • Breast cancer Sister 73   • Breast cancer Other 38     neice gene negative    • Cancer Sister      of unknown orgin    •  "Malig Hyperthermia Neg Hx        Vital Signs:  /88  Pulse 78  Ht 154.9 cm (61\")  Wt 57.6 kg (127 lb)  LMP  (LMP Unknown)  SpO2 97%  BMI 24 kg/m2     Medications:    Current Outpatient Prescriptions:   •  Golimumab (SIMPONI) 100 MG/ML solution auto-injector, Inject  under the skin., Disp: , Rfl:   •  mesalamine (APRISO) 0.375 G 24 hr capsule, Take 2 tablets by mouth daily (Patient taking differently: Take 375 mg by mouth 2 (Two) Times a Day.), Disp: 180 capsule, Rfl: 3  •  PARoxetine (PAXIL) 10 MG tablet, Take 10 mg by mouth Every Morning., Disp: , Rfl:      Allergies:  Allergies   Allergen Reactions   • Latex      Added based on information entered during log entry, please review and add reactions, type, and severity as needed   • Contrast Dye Rash       Physical Examination:  /88  Pulse 78  Ht 154.9 cm (61\")  Wt 57.6 kg (127 lb)  LMP  (LMP Unknown)  SpO2 97%  BMI 24 kg/m2  General Appearance:  Patient is in no distress.  She is well kept and has an average build.   Psychiatric:  Patient with appropriate mood and affect. Alert and oriented to self, time, and place.    Breast, RIGHT:  small sized, symmetric with the contralateral side.  Breast skin is without erythema, edema, rashes.  There are no visible abnormalities upon inspection during the arm-raising maneuver or with hands on hips in the sitting position. There is no nipple retraction, discharge or nipple/areolar skin changes.There are no masses palpable in the sitting or supine positions.    Breast, LEFT:  small sized, symmetric with the contralateral side.  Breast skin is without erythema, edema, rashes.  There are no visible abnormalities upon inspection during the arm-raising maneuver or with hands on hips in the sitting position. There is no nipple retraction, discharge or nipple/areolar skin changes.There are no masses palpable in the sitting or supine positions. There Is a well-healed radial incision inferiorly at 6:00 with no " erythema warmth or drainage from her January 2018 lumpectomy for DCIS.    Lymphatic:  There is no axillary, cervical, infraclavicular, or supraclavicular adenopathy bilaterally.  Eyes:  Pupils are round and reactive to light.  Cardiovascular:  Heart rate and rhythm are regular.  Respiratory:  Lungs are clear bilaterally with no crackles or wheezes in any lung field.  Gastrointestinal:  Abdomen is soft, nondistended, and nontender.     Musculoskeletal:  Good strength in all 4 extremities.   There is good range of motion in both shoulders.    Skin:  No new skin lesions or rashes on the skin excluding the breast (see breast exam above).        Imaging:  10/26/16  [facility]      MAMMO/ SCREENING MAMMO FRANCISCA THRASHER  Heterogeneously dense.  BIRADS category 1. Negative     10/31/2017     [facility]  MAMMO SCREENING  FRANCISCA THRASHER  There is a new group of microcalcifications lower inner left breast 3-4 cm from the nipple.   BIRADS category 0.    11/16/2017 [facility]     MAMMO/DIAG UNT LT DIGITAL FRANCISCA THRASHER  Group of pleomorphic microcalcifications lower inner left breast 6:00 to 7:00 3-4 cm from the nipple.  BIRADS category 4    12/07/17 [facility] BREAST MRI  FRANCISCA THRASHER  Left breast 6’oclock the order of 5cm from the nipple there is a metallic clip within a postbiopsy cavity.  There are some thin linear enhancement seen along the anterior and posterior margins of the cavity.  The cavity and associated thin linear enhancement measured 2.2 cm in anterior to posterior dimension, 1.5 cm in the superior to inferior dimension and 2.9 cm in the mediolateral dimension.  There are no other areas of abnormal enhancement in the left breast.  No evidence for left axillary adenopathy.  There are no areas of abnormal enhancement or morphology in the right breast.    Pathology:  11/27/17 [facility]     MAMMO/STERIOTACTIC LOCALIZE LT     FRANCISCA THRASHER  9 gauge Eviva.   18 core biopsy.  Tissue marker was deployed.  Post procedure mammography showed the tissue marker to be at the target site.     11/27/17 [facility] PATHOLOGY REPORT [facility]  3. Left breast lower inner quadrant at 3-4 cm from nipple: clinically with microcalcifications:  ductal carcinoma in situ, solid and cribriform types, low grade.  Negative for invasive malignancy.    4. Left breast, lower inner quadrant at 3-4 cm from nipple; clinically tissue without microcalcifications:  single focus of ductal carcinoma in situ, solid type with microcalcification identified.    11/27/17 [facility]  HORMONE RECEPTORS  FRANCISCA THRASHER  Estrogen  100   Progesterone   100    01/11/18 [facility]  PATHOLOGY  FRANCISCA THRASHER  Final Diagnosis   LEFT BREAST LUMPECTOMY:                         DUCT CARCINOMA IN SITU, SOLID, LOW GRADE WITH ABSENCE OF NECROSIS.                          FOCALLY, DUCT CARCINOMA IN-SITU IS LESS THAN  0.5 MM FROM LATERAL MARGIN.         The following synoptic report utilizes the June 2017 [facility] protocol for duct carcinoma in situ.     Procedure: Excision.  Specimen laterality: Left.  Size (extent) of DCIS: Estimated extent of DCIS is at least 8 mm.                         Comment: This measurement is based on the presence of DCIS present focally on slides from two blocks, each                         with estimated thickness of 4 mm.   Histologic type: Duct carcinoma in situ.  Nuclear grade: Grade 1 (low).  Necrosis: Not identified.  Margins: Uninvolved by duct carcinoma in situ.                         Distance from closest margin: Less than 0.5 mm from lateral margin.  Regional lymph nodes: No lymph nodes submitted or found.  Pathologic staging:                         Primary tumor: pTis(DCIS).      CMK/pkm/brb/pkm  IHC/TDJ/THM           Procedures:      Assessment:   Diagnosis Plan   1. Ductal carcinoma in situ (DCIS) of left breast  Mammo Screening Digital Tomosynthesis Bilateral With CAD      1-2  Left  breast lower inner quadrant, 6:30, 3 cm from the nipple, 6 mm of calcification clustered on her initial mammogram.  2.2 cm postbiopsy hematoma with enhancement at the perimeter of the cavity.    Clinical stage TisN 0 stage 0.    1-11-7 lumpectomy for DCIS returned as 8 mm of low-grade ductal carcinoma in situ.  Margins are uninvolved.  Distance from closest margin is less than a half millimeter laterally.    Discussed this case with Dr. Ivett Smalls due to the 100% hormone fed nature of the tumor and low grade, she did not feel that reexcision was necessary laterally.    3-  1 of 2 sisters age 73.  One niece, the daughter of cysts #2 diagnosed in late 20s.  Invitae panel test 1-8-18 negative for mutation.    4-  Cervical cancer in 1990, treated with total abdominal hysterectomy and required no chemotherapy.    5-  Ulcerative colitis diagnosed age 34, treated by Dr. Lucia at [facility].    Plan:    Amber, her  and I reviewed her interval history, pathology report and exam together today she is healing nicely.  We were pleased with her pathology results.  There was no invasive carcinoma.  There was one focal area on one slide that showed the DCIS to come within a half millimeter of a margin.  Due to the very focal nature of this and the low-grade 100% estrogen fed nature, we will not need to proceed with reexcision.  I did discuss this case with Dr. Ivett Smalls for medical oncology.  I'll arrange for her see medical oncology and radiation oncology at [facility] and we'll see her back in November after a bilateral mammogram at [facility].  I asked to continue her self breast exam and to call me interim with any concerns or changes and we'd be happy to see her back sooner.      She does see Dr. Lucia for her ulcerative colitis, and she has restarted the  simponi weekly injections and the mesalamine.    Anne Harvey MD        Next Appointment:  Return for Next scheduled follow up, after imaging.      EMR  Dragon/transcription disclaimer:    Much of this encounter note is an electronic transcription/translocation of spoken language to printed text.  The electronic translation of spoken language may permit erroneous, or at times, nonsensical words or phrases to be inadvertently transcribed.  Although I have reviewed the note from such areas, some may still exist.

## 2018-01-26 ENCOUNTER — TELEPHONE (OUTPATIENT)
Dept: SURGERY | Facility: CLINIC | Age: 63
End: 2018-01-26

## 2018-01-26 NOTE — TELEPHONE ENCOUNTER
Fax received from [employer] that PA denied on 1/9/18 and that can file an appeal. Call to [employer] and spoke with Kait to advise that already aware of initial denial, and that appeal has been faxed on 1/15/18 - need status of appeal.   Kait instructs to call [facility].      Call to [facility] @ [phone] and spoke with Johnathon.  She states that appeal still in process.  Received 1/18 and has 30 days to process.

## 2018-01-26 NOTE — TELEPHONE ENCOUNTER
Note from Dr. Villa dated January 25, 2018: He is advised antiestrogen therapy.  Patient to proceed with adjuvant radiation therapy with Dr. Mirza.  Follow-up in 6 weeks.

## 2018-01-30 NOTE — TELEPHONE ENCOUNTER
Thanks for update. If this continues without an OK for long consider contacting the [employer] that sells this drug to see if they have any ideas how to get this PA OK'd. darin

## 2018-01-30 NOTE — TELEPHONE ENCOUNTER
Call from pt.  Advise that PA appeal in process - waiting 30 days from 1/18 for answer.    Pt reports that has seen surgeon and cleared to resume Simponi.  Had on hand, and so spouse has resumed - have 2 additional doses on hand.  If appeal denied, pt has contact at [employer] program and will obtain there.    Pt states doing well.    Update to DR Lucia.

## 2018-01-31 ENCOUNTER — TELEPHONE (OUTPATIENT)
Dept: SURGERY | Facility: CLINIC | Age: 63
End: 2018-01-31

## 2018-01-31 NOTE — TELEPHONE ENCOUNTER
No dated June 25, 2018 from Dr. David Robles [facility] radiation oncology.  Impression: We recommended the patient have radiation to her intact left breast.  Planning 6040 cGy in 33 fractions over 6 1/2 weeks.

## 2018-02-02 ENCOUNTER — TELEPHONE (OUTPATIENT)
Dept: GASTROENTEROLOGY | Facility: CLINIC | Age: 63
End: 2018-02-02

## 2018-02-02 NOTE — TELEPHONE ENCOUNTER
----- Message from Amber Sesay sent at 2/2/2018  9:54 AM EST -----  Regarding: Prescription Question  Contact: [phone]  We just wanted to let your office know that Amber has been accepted into the link program with [employer], the  of Simponi, and will be getting her prescription for the next 12 months free of charge.    Thank you for your appeals to [facility]. This seems to have worked out for the best.    Tristian and Amber Sesay

## 2018-02-02 NOTE — TELEPHONE ENCOUNTER
I am glad to hear that she will get Simponi free of charge for the next one year from the . Find out if she can be restarted on Simponi (per her breast cancer doctor). IF she can be restarted then do what needs to be done to put her back on Simponi. Have her come see me in the office in 8ish weeks.  [email or web address]

## 2018-02-02 NOTE — TELEPHONE ENCOUNTER
See note of 1/5/18 - pt has resumed Simponi.    Call to spouse, Jorge (see HIPAA auth of 3/23/16).  Advise per Dr Lucia that he would like to see pt in about 8 weeks.      Jorge verb understanding.  States pt will begin radiation next week.  Will call back to make f/u appt when more sure of schedule.    Update to DR Lucia.

## 2018-02-08 ENCOUNTER — TELEPHONE (OUTPATIENT)
Dept: SURGERY | Facility: CLINIC | Age: 63
End: 2018-02-08

## 2018-02-08 NOTE — TELEPHONE ENCOUNTER
Patient sent e-mail via Epic requesting letter from our office supporting Dr. David Mirza to proceed with Radiation Therapy.     Letter prepared and signed by physician.     Contacted patient, was told this is now not needed, they will be seeing Dr. MARION Olivarez.     The letter will be scanned into patient chart. Milo

## 2018-02-12 ENCOUNTER — TELEPHONE (OUTPATIENT)
Dept: SURGERY | Facility: CLINIC | Age: 63
End: 2018-02-12

## 2018-02-12 NOTE — TELEPHONE ENCOUNTER
Note from genetics dated December 31, 2017 invitae panel test negative for any mutations.    Saw Dr Olivarez 2-12-18- pt wants to have whole breast irradiation; she'd like to proceed with oncotpe evaluation.  She recommended 15 treatements rather than the 33 recommended at [place].  Pt wished to proceed.

## 2018-02-19 ENCOUNTER — TELEPHONE (OUTPATIENT)
Dept: GASTROENTEROLOGY | Facility: CLINIC | Age: 63
End: 2018-02-19

## 2018-02-19 NOTE — TELEPHONE ENCOUNTER
Call from Anisa, Pt Access Specialist with [employer] @ [phone].  Checking if any further notice has been received re: [employer] denial appeal.  [employer] last checked 2/16/18.  Advise Anisa that no additional info has been received.  She will check further.    She requests if info received, call to [employer] [facility] @ [phone]  Opt 1, or fax to .

## 2018-03-22 ENCOUNTER — TELEPHONE (OUTPATIENT)
Dept: SURGERY | Facility: CLINIC | Age: 63
End: 2018-03-22

## 2018-03-22 NOTE — TELEPHONE ENCOUNTER
Note from Dr. Davida Olivarez dated March 14, 2018: Radiation therapy started the second 2018 and completed March 14, 2018.  She delivered a dose of 4050 cGy in 15 fractions.    Note from Dr. Villa dated March 8, 2018: Start taking tamoxifen 20 mg after completion of radiation therapy.

## 2018-04-17 ENCOUNTER — TRANSCRIBE ORDERS (OUTPATIENT)
Dept: ADMINISTRATIVE | Facility: HOSPITAL | Age: 63
End: 2018-04-17

## 2018-04-17 DIAGNOSIS — C50.312 MALIGNANT NEOPLASM OF LOWER-INNER QUADRANT OF LEFT FEMALE BREAST, UNSPECIFIED ESTROGEN RECEPTOR STATUS (HCC): Primary | ICD-10-CM

## 2018-05-21 ENCOUNTER — OFFICE VISIT (OUTPATIENT)
Dept: GASTROENTEROLOGY | Facility: CLINIC | Age: 63
End: 2018-05-21

## 2018-05-21 VITALS
SYSTOLIC BLOOD PRESSURE: 112 MMHG | WEIGHT: 122.4 LBS | BODY MASS INDEX: 23.11 KG/M2 | DIASTOLIC BLOOD PRESSURE: 66 MMHG | HEIGHT: 61 IN | TEMPERATURE: 97.8 F

## 2018-05-21 DIAGNOSIS — D84.9 IMMUNOCOMPROMISED (HCC): ICD-10-CM

## 2018-05-21 DIAGNOSIS — K51.90 ULCERATIVE COLITIS WITHOUT COMPLICATIONS, UNSPECIFIED LOCATION (HCC): Primary | ICD-10-CM

## 2018-05-21 PROCEDURE — 99213 OFFICE O/P EST LOW 20 MIN: CPT | Performed by: INTERNAL MEDICINE

## 2018-05-21 RX ORDER — FLUTICASONE PROPIONATE 50 MCG
SPRAY, SUSPENSION (ML) NASAL AS NEEDED
COMMUNITY
Start: 2018-04-23

## 2018-05-21 RX ORDER — CLONIDINE HYDROCHLORIDE 0.1 MG/1
TABLET ORAL
COMMUNITY
Start: 2018-05-15 | End: 2019-12-02

## 2018-05-21 RX ORDER — TAMOXIFEN CITRATE 20 MG/1
TABLET ORAL
COMMUNITY
Start: 2018-05-09

## 2018-05-21 RX ORDER — ESCITALOPRAM OXALATE 5 MG/1
TABLET ORAL
COMMUNITY
Start: 2018-04-28

## 2018-05-21 NOTE — PROGRESS NOTES
Chief Complaint   Patient presents with   • Follow-up   • Ulcerative Colitis       History of Present Illness: 61 yo female with a  h/o left sided ulcerative colitis diagnosed at 34 yrs of age, on Simponi. She had breast cancer with 1/18 lumpectomy and XRT.  No diarrhea. She missed one month of Simponi. She has been on Simponi for 2-3 yrs.  No rectal bleeding or melena. Her insurance company has refused to pay for Simponi. The  is paying for.       Past Medical History:   Diagnosis Date   • Anemia 2014    recovered   • Anxiety    • Breast cancer     LEFT 2017   • Cervical cancer 1990    LEFT BREAST 2017   • Chronic pancolonic ulcerative colitis     DIAGNOSED 25 YEARS AGO   • History of transfusion    • Lactose intolerance    • PONV (postoperative nausea and vomiting)        Past Surgical History:   Procedure Laterality Date   • BREAST BIOPSY     • BREAST LUMPECTOMY Left 1/11/2018    Procedure: LEFT needle localized lumpectomy;  Surgeon: Anne Harvey MD;  Location: [facility];  Service:    • COLONOSCOPY  03/20/2015    MILD TO MODERATE PANCOLITIS W/SCARRING THROUGHOUT COLON, IH, ACUTE CRYPTITIS, ACTIVE COLITIS   • COLONOSCOPY N/A 4/25/2017    Lots of scarring throughout the colon from her long h/o of ulcerative colitis, mild left sided ulcerative colitis, IH.  PATH: mild chronic active colitis.     • ENDOSCOPY  03/20/2015    ERYTHEMATOUS MUCOSA IN STOMACH, LEIOMYOMA, REACTIVE MUCOSAL CHANGES, CHRONIC INFLAMMATION   • HYSTERECTOMY     • UPPER GASTROINTESTINAL ENDOSCOPY  03/20/2015    COMPLETED BY DR. MATT FIGUEROA         Current Outpatient Prescriptions:   •  CloNIDine (CATAPRES) 0.1 MG tablet, , Disp: , Rfl:   •  escitalopram (LEXAPRO) 5 MG tablet, , Disp: , Rfl:   •  fluticasone (FLONASE) 50 MCG/ACT nasal spray, , Disp: , Rfl:   •  Golimumab (SIMPONI) 100 MG/ML solution auto-injector, Inject  under the skin., Disp: , Rfl:   •  mesalamine (APRISO) 0.375 G 24 hr capsule, Take 2 tablets by mouth  daily (Patient taking differently: Take 375 mg by mouth 2 (Two) Times a Day.), Disp: 180 capsule, Rfl: 3  •  tamoxifen (NOLVADEX) 20 MG chemo tablet, , Disp: , Rfl:   •  PARoxetine (PAXIL) 10 MG tablet, Take 10 mg by mouth Every Morning., Disp: , Rfl:     Allergies   Allergen Reactions   • Latex      Added based on information entered during log entry, please review and add reactions, type, and severity as needed   • Contrast Dye Rash       Family History   Problem Relation Age of Onset   • Liver cancer Mother    • Breast cancer Sister 73   • Breast cancer Other 38        neice gene negative    • Cancer Sister         of unknown orgin    • Malig Hyperthermia Neg Hx        Social History     Social History   • Marital status:      Spouse name: N/A   • Number of children: N/A   • Years of education: N/A     Occupational History   • Not on file.     Social History Main Topics   • Smoking status: Never Smoker   • Smokeless tobacco: Never Used   • Alcohol use Yes     1 Glasses of wine per week      Comment: on occ   • Drug use: No   • Sexual activity: Not Currently     Partners: Male     Birth control/ protection: Abstinence, Post-menopausal, Other      Comment: Occasionally active with spouse     Other Topics Concern   • Not on file     Social History Narrative   • No narrative on file       Review of Systems   All other systems reviewed and are negative.      Vitals:    05/21/18 1048   BP: 112/66   Temp: 97.8 °F (36.6 °C)       Physical Exam   Constitutional: She is oriented to person, place, and time. She appears well-developed and well-nourished. No distress.   HENT:   Head: Normocephalic and atraumatic. Hair is normal.   Right Ear: Hearing, tympanic membrane, external ear and ear canal normal. No drainage. No decreased hearing is noted.   Left Ear: Hearing, tympanic membrane, external ear and ear canal normal. No decreased hearing is noted.   Nose: No nasal deformity.   Mouth/Throat: Oropharynx is clear and  moist.   Eyes: Conjunctivae, EOM and lids are normal. Pupils are equal, round, and reactive to light. Right eye exhibits no discharge. Left eye exhibits no discharge.   Neck: Normal range of motion. Neck supple. No JVD present. No tracheal deviation present. No thyromegaly present.   Cardiovascular: Normal rate, regular rhythm, normal heart sounds, intact distal pulses and normal pulses.  Exam reveals no gallop and no friction rub.    No murmur heard.  Pulmonary/Chest: Effort normal and breath sounds normal. No respiratory distress. She has no wheezes. She has no rales. She exhibits no tenderness.   Abdominal: Soft. Bowel sounds are normal. She exhibits no distension and no mass. There is no tenderness. There is no rebound and no guarding. No hernia.   Musculoskeletal: Normal range of motion. She exhibits no edema, tenderness or deformity.   Lymphadenopathy:     She has no cervical adenopathy.   Neurological: She is alert and oriented to person, place, and time. She has normal reflexes. She displays normal reflexes. No cranial nerve deficit. She exhibits normal muscle tone. Coordination normal.   Skin: Skin is warm and dry. No rash noted. She is not diaphoretic. No erythema.   Psychiatric: She has a normal mood and affect. Her behavior is normal. Judgment and thought content normal.   Vitals reviewed.      Amber was seen today for follow-up and ulcerative colitis.    Diagnoses and all orders for this visit:    Ulcerative colitis without complications, unspecified location    Immunocompromised      Assessment:  1) h/o left sided ulcerative colitis, on Simponi  2) h/o +PPD (false +) but her Quantiferon Gold has been negative.  3) H/O breast cancer.     Recommendations:  1) Continue the Simponi  2) f/u 6 mos.     No Follow-up on file.    Joni Lucia MD  5/21/2018

## 2018-06-11 ENCOUNTER — TELEPHONE (OUTPATIENT)
Dept: GASTROENTEROLOGY | Facility: CLINIC | Age: 63
End: 2018-06-11

## 2018-06-11 NOTE — TELEPHONE ENCOUNTER
Faxed request received from [facility] stating that PA required for Simponi  - provider should obtain thru [employer].      [employer] forms downloaded, initiated and to DR Lucia.

## 2018-06-13 NOTE — TELEPHONE ENCOUNTER
"Call to [facility] @ [phone] and spoke with Evelyn.  Preet denied on 6/11 because \"step therapy not met.  Requires trial of Humira or Enbrel.\"    Will fax denial to .    Call to [facility] @ [phone] to update PA status.   Instructed to call back after 2:45 as all staff in meeting.    "

## 2018-06-13 NOTE — TELEPHONE ENCOUNTER
Call to [facility] , ext [phone].  Message left for  Erika Hunt that PA for Simponi has been denied, and understand they will provide med for pt.  Request call back to confirm.

## 2018-06-14 NOTE — TELEPHONE ENCOUNTER
Message received from Erika Hunt at [facility] stating that message received.  Asking if this is PA denial, or appeal denial.    VM to Erika to advise that PA denial - completed per [facility] request.

## 2018-06-19 NOTE — TELEPHONE ENCOUNTER
Call to Erika Hunt.  She states that pt will continue to receive Simponi thru [facility] - good thru 12/31/18.  Pt has been advised of same and shipment sent.    States at first of year, will require same effort to be made to obtain Simponi thru the [facility]    Update to Dr Lucia.

## 2018-06-22 RX ORDER — MESALAMINE 375 MG/1
CAPSULE, EXTENDED RELEASE ORAL
Qty: 60 CAPSULE | Refills: 0 | Status: SHIPPED | OUTPATIENT
Start: 2018-06-22 | End: 2018-07-21 | Stop reason: SDUPTHER

## 2018-06-22 NOTE — TELEPHONE ENCOUNTER
Faxed request received from [facility] for Simponi 100 mg/ml - auto injector -  inject 100 mg under skin every 4 weeks, #1, R0.    See note of 6/11.    Call to pt.  States receives Simponi thru [facility], although has not yet received shipment as per note of 6/19.  States would expect to receive next week - will contact office with update.  (will leave this refill request open at present).

## 2018-06-25 RX ORDER — GOLIMUMAB 100 MG/ML
INJECTION, SOLUTION SUBCUTANEOUS
Qty: 1 SYRINGE | Refills: 0 | Status: SHIPPED | OUTPATIENT
Start: 2018-06-25 | End: 2018-08-23 | Stop reason: SDUPTHER

## 2018-06-25 NOTE — TELEPHONE ENCOUNTER
"Call from spouse, Jorge (see HIPAA auth of 12/18/17).  He reports that does receive Simponi as authorized thru [facility] thru [facility] pharmacy.  Only able to receive one month at a time.     Per DR Lucia o/v note of 5/21/18 - \"continue simponi\".  Escribe completed as requested - update to DR Lucia.  "

## 2018-07-03 ENCOUNTER — TELEPHONE (OUTPATIENT)
Dept: SURGERY | Facility: CLINIC | Age: 63
End: 2018-07-03

## 2018-07-03 NOTE — TELEPHONE ENCOUNTER
Count from [facility] oncology Dr. Paniagua: Currently on hormonal blockade using tamoxifen with good tolerance.  Has a follow-up with her in 3 months.  She started tamoxifen around March 2018.

## 2018-07-25 RX ORDER — MESALAMINE 375 MG/1
CAPSULE, EXTENDED RELEASE ORAL
Qty: 60 CAPSULE | Refills: 0 | Status: SHIPPED | OUTPATIENT
Start: 2018-07-25 | End: 2018-08-22 | Stop reason: SDUPTHER

## 2018-08-22 RX ORDER — MESALAMINE 375 MG/1
CAPSULE, EXTENDED RELEASE ORAL
Qty: 60 CAPSULE | Refills: 2 | Status: SHIPPED | OUTPATIENT
Start: 2018-08-22 | End: 2018-11-18 | Stop reason: SDUPTHER

## 2018-08-29 RX ORDER — GOLIMUMAB 100 MG/ML
INJECTION, SOLUTION SUBCUTANEOUS
Qty: 1 SYRINGE | Refills: 3 | Status: SHIPPED | OUTPATIENT
Start: 2018-08-29 | End: 2018-12-29 | Stop reason: SDUPTHER

## 2018-09-25 ENCOUNTER — TELEPHONE (OUTPATIENT)
Dept: SURGERY | Facility: CLINIC | Age: 63
End: 2018-09-25

## 2018-09-25 NOTE — TELEPHONE ENCOUNTER
Note from [facility] Doctor Davida Olivarez: Irradiation started February 2, 2018 and completed March 14, 2018.  Left breast was treated in 15 fractions.  No evidence of disease.  Return to see us in 6 months.

## 2018-10-01 ENCOUNTER — TELEPHONE (OUTPATIENT)
Dept: SURGERY | Facility: CLINIC | Age: 63
End: 2018-10-01

## 2018-10-01 NOTE — TELEPHONE ENCOUNTER
Scheduled mammo at [facility] 11/5/18 arrive 1pm    F'/u 11. 14.18 arrive 12:45pm    pts  confirmed    rr

## 2018-10-26 ENCOUNTER — TELEPHONE (OUTPATIENT)
Dept: SURGERY | Facility: CLINIC | Age: 63
End: 2018-10-26

## 2018-10-26 NOTE — TELEPHONE ENCOUNTER
Note from [facility] Dr. Paniagua September 13, 2018 continue tamoxifen 20 mg a day.  Follow-up 3 months.

## 2018-11-14 ENCOUNTER — OFFICE VISIT (OUTPATIENT)
Dept: SURGERY | Facility: CLINIC | Age: 63
End: 2018-11-14

## 2018-11-14 VITALS
HEART RATE: 64 BPM | WEIGHT: 123 LBS | OXYGEN SATURATION: 97 % | HEIGHT: 61 IN | DIASTOLIC BLOOD PRESSURE: 74 MMHG | SYSTOLIC BLOOD PRESSURE: 107 MMHG | BODY MASS INDEX: 23.22 KG/M2

## 2018-11-14 DIAGNOSIS — Z80.3 FH: BREAST CANCER: ICD-10-CM

## 2018-11-14 DIAGNOSIS — Z85.41 HISTORY OF CERVICAL CANCER: ICD-10-CM

## 2018-11-14 DIAGNOSIS — K51.90 ULCERATIVE COLITIS WITHOUT COMPLICATIONS, UNSPECIFIED LOCATION (HCC): ICD-10-CM

## 2018-11-14 DIAGNOSIS — D05.12 DUCTAL CARCINOMA IN SITU (DCIS) OF LEFT BREAST: Primary | ICD-10-CM

## 2018-11-14 PROCEDURE — 99213 OFFICE O/P EST LOW 20 MIN: CPT | Performed by: SURGERY

## 2018-11-14 NOTE — PROGRESS NOTES
Chief Complaint: Amber Thrasher is a 62 y.o. female who was seen in consultation at the request of No ref. provider found  for newly diagnosed breast cancer and a followup visit    History of Present Illness:  Patient presents with newly diagnosed DCIS, . She noted no new masses, skin changes, nipple discharge, nipple changes prior to her most recent imaging.    Her most recent imaging includes the following:   10/26/16  [facility]      MAMMO/ SCREENING MAMMO AMBER THRASHER  Heterogeneously dense.  BIRADS category 1. Negative     10/31/2017     [facility]  MAMMO SCREENING  AMBER THRASHER  There is a new group of microcalcifications lower inner left breast 3-4 cm from the nipple.   BIRADS category 0.    11/16/2017 [facility]     MAMMO/DIAG UNT LT DIGITAL AMBER THRASHER  Group of pleomorphic microcalcifications lower inner left breast 6:00 to 7:00 3-4 cm from the nipple.  BIRADS category 4        She had a biopsy on the following day that showed:   11/27/17 [facility]     MAMMO/STERIOTACTIC LOCALIZE LT     AMBER THRASHER  9 gauge Eviva.  18 core biopsy.  Tissue marker was deployed.  Post procedure mammography showed the tissue marker to be at the target site.     11/27/17 [facility] PATHOLOGY REPORT AMBER THRASHER  1. Left breast lower inner quadrant at 3-4 cm from nipple: clinically with microcalcifications:  ductal carcinoma in situ, solid and cribriform types, low grade.  Negative for invasive malignancy.    2. Left breast, lower inner quadrant at 3-4 cm from nipple; clinically tissue without microcalcifications:  single focus of ductal carcinoma in situ, solid type with microcalcification identified.    11/27/17 [facility]  HORMONE RECEPTORS  AMBER THRASHER  Estrogen  100   Progesterone   100       I then arranged for a breast MRI:  12/07/17 [facility] BREAST MRI  AMBER THRASHER  Left breast 6’oclock the order of 5cm from the nipple there is a metallic  clip within a postbiopsy cavity.  There are some thin linear enhancement seen along the anterior and posterior margins of the cavity.  The cavity and associated thin linear enhancement measured 2.2 cm in anterior to posterior dimension, 1.5 cm in the superior to inferior dimension and 2.9 cm in the mediolateral dimension.  There are no other areas of abnormal enhancement in the left breast.  No evidence for left axillary adenopathy.  There are no areas of abnormal enhancement or morphology in the right breast.    She has not had a breast biopsy in the past.  She has her ovaries, she had her uterus removed in 1990 for cervical cancer.      Invitae panel test 1-8-18 negative for mutation.    1-11-7 lumpectomy for DCIS returned as 8 mm of low-grade ductal carcinoma in situ.  Margins are uninvolved.  Distance from closest margin is less than a half millimeter laterally.    Discussed this case with Dr. Ivett Smalls due to the 100% hormone fed nature of the tumor and low grade, she did not feel that reexcision was necessary laterally.    Interval History:  In the interim,  Amber Sesay  has done well.  She has noted no changes in her breast exam. No new masses, skin changes, nipple changes, nipple discharge either breast.   She denies headache, bone pain, belly pain, cough, changes in vision or gait.    Her most recent imaging includes the following:  [place] bilateral screening mammogram November 5, 2018.      Note from genetics dated December 31, 2017 invitae panel test negative for any mutations.     Note from Dr. Davida Olivarez dated March 14, 2018: Radiation therapy started the second 2018 and completed March 14, 2018.  She delivered a dose of 4050 cGy in 15 fractions.     Note from Dr. Paniagua dated March 8, 2018: Start taking tamoxifen 20 mg after completion of radiation therapy.        Review of Systems:  Review of Systems   Constitutional: Negative for unexpected weight change (4 lb wt loss).   All other  systems reviewed and are negative.       Past Medical and Surgical History:  Breast Biopsy History:  Patient had not had a breast biopsy prior to her cancer diagnosis.  Breast Cancer HIstory:  Patient does not have a past medical history of breast cancer.  Breast Operations, and year:  None   Obstetric/Gynecologic History:  Age menstrual periods began:teens   Patient is postmenopausal due to removal of her uterus in the following year:1990   Number of pregnancies:3  Number of live births: 2  Number of abortions or miscarriages: 1  Age of delivery of first child: 22  Patient did not breast feed.  Length of time taking birth control pills: 7 yrs   Patient has never taken hormone replacement  Patient had uterus removed 1990    Past Surgical History:   Procedure Laterality Date   • BREAST BIOPSY     • COLONOSCOPY  03/20/2015    MILD TO MODERATE PANCOLITIS W/SCARRING THROUGHOUT COLON, IH, ACUTE CRYPTITIS, ACTIVE COLITIS   • ENDOSCOPY  03/20/2015    ERYTHEMATOUS MUCOSA IN STOMACH, LEIOMYOMA, REACTIVE MUCOSAL CHANGES, CHRONIC INFLAMMATION   • HYSTERECTOMY     • UPPER GASTROINTESTINAL ENDOSCOPY  03/20/2015    COMPLETED BY DR. MATT FIGUEROA       Past Medical History:   Diagnosis Date   • Anemia 2014    recovered   • Anxiety    • Breast cancer (CMS/HCC)     LEFT 2017   • Cervical cancer (CMS/HCC) 1990    LEFT BREAST 2017   • Chronic pancolonic ulcerative colitis (CMS/HCC)     DIAGNOSED 25 YEARS AGO   • History of transfusion    • Lactose intolerance    • PONV (postoperative nausea and vomiting)        Prior Hospitalizations, other than for surgery or childbirth, and year:  Ulcerative colitis-Anemic     Social History     Socioeconomic History   • Marital status:      Spouse name: Not on file   • Number of children: Not on file   • Years of education: Not on file   • Highest education level: Not on file   Social Needs   • Financial resource strain: Not on file   • Food insecurity - worry: Not on file   • Food insecurity  "- inability: Not on file   • Transportation needs - medical: Not on file   • Transportation needs - non-medical: Not on file   Occupational History   • Not on file   Tobacco Use   • Smoking status: Never Smoker   • Smokeless tobacco: Never Used   Substance and Sexual Activity   • Alcohol use: Yes     Types: 1 Glasses of wine per week     Comment: on occ   • Drug use: No   • Sexual activity: Not Currently     Partners: Male     Birth control/protection: Abstinence, Post-menopausal, Other     Comment: Occasionally active with spouse   Other Topics Concern   • Not on file   Social History Narrative   • Not on file     Patient is .  Patient is unemployed.  Patient drinks 2 servings of caffeine per day.    Family History:  Family History   Problem Relation Age of Onset   • Liver cancer Mother    • Breast cancer Sister 73   • Breast cancer Other 38        neice gene negative    • Cancer Sister         of unknown orgin    • Malig Hyperthermia Neg Hx        Vital Signs:  /74   Pulse 64   Ht 154.9 cm (61\")   Wt 55.8 kg (123 lb)   LMP  (LMP Unknown)   SpO2 97%   BMI 23.24 kg/m²      Medications:    Current Outpatient Medications:   •  APRISO 0.375 g 24 hr capsule, TAKE TWO CAPSULES BY MOUTH DAILY, Disp: 60 capsule, Rfl: 2  •  CloNIDine (CATAPRES) 0.1 MG tablet, , Disp: , Rfl:   •  fluticasone (FLONASE) 50 MCG/ACT nasal spray, , Disp: , Rfl:   •  SIMPONI 100 MG/ML solution auto-injector, INJECT 100MG UNDER THE SKIN EVERY 4 WEEKS, Disp: 1 syringe, Rfl: 3  •  tamoxifen (NOLVADEX) 20 MG chemo tablet, , Disp: , Rfl:   •  escitalopram (LEXAPRO) 5 MG tablet, , Disp: , Rfl:   •  PARoxetine (PAXIL) 10 MG tablet, Take 10 mg by mouth Every Morning., Disp: , Rfl:      Allergies:  Allergies   Allergen Reactions   • Latex      Added based on information entered during log entry, please review and add reactions, type, and severity as needed   • Contrast Dye Rash       Physical Examination:  /74   Pulse 64   Ht " "154.9 cm (61\")   Wt 55.8 kg (123 lb)   LMP  (LMP Unknown)   SpO2 97%   BMI 23.24 kg/m²   General Appearance:  Patient is in no distress.  She is well kept and has an average build.   Psychiatric:  Patient with appropriate mood and affect. Alert and oriented to self, time, and place.    Breast, RIGHT:  small sized, symmetric with the contralateral side.  Breast skin is without erythema, edema, rashes.  There are no visible abnormalities upon inspection during the arm-raising maneuver or with hands on hips in the sitting position. There is no nipple retraction, discharge or nipple/areolar skin changes.There are no masses palpable in the sitting or supine positions.    Breast, LEFT:  small sized, symmetric with the contralateral side.  Breast skin is without erythema, edema, rashes. Thre is some hyperpigmentation from her radiation. There are no visible abnormalities upon inspection during the arm-raising maneuver or with hands on hips in the sitting position. There is no nipple retraction, discharge or nipple/areolar skin changes.There are no masses palpable in the sitting or supine positions. There Is a well-healed radial incision inferiorly at 6:00  from her January 2018 lumpectomy for DCIS.    Lymphatic:  There is no axillary, cervical, infraclavicular, or supraclavicular adenopathy bilaterally.  Eyes:  Pupils are round and reactive to light.  Cardiovascular:  Heart rate and rhythm are regular.  Respiratory:  Lungs are clear bilaterally with no crackles or wheezes in any lung field.  Gastrointestinal:  Abdomen is soft, nondistended, and nontender.     Musculoskeletal:  Good strength in all 4 extremities.   There is good range of motion in both shoulders.    Skin:  No new skin lesions or rashes on the skin excluding the breast (see breast exam above).        Imaging:  10/26/16  [facility]      MAMMO/ SCREENING MAMMO FRANCISCA THRASHER  Heterogeneously dense.  BIRADS category 1. Negative     10/31/2017     " [facility]  MAMMO SCREENING  FRANCISCA THRASHER  There is a new group of microcalcifications lower inner left breast 3-4 cm from the nipple.   BIRADS category 0.    11/16/2017 [facility]     MAMMO/DIAG UNT LT DIGITAL FRANCISCA THRASHER  Group of pleomorphic microcalcifications lower inner left breast 6:00 to 7:00 3-4 cm from the nipple.  BIRADS category 4    12/07/17 [facility] BREAST MRI  FRANCISCA THRASHER  Left breast 6’oclock the order of 5cm from the nipple there is a metallic clip within a postbiopsy cavity.  There are some thin linear enhancement seen along the anterior and posterior margins of the cavity.  The cavity and associated thin linear enhancement measured 2.2 cm in anterior to posterior dimension, 1.5 cm in the superior to inferior dimension and 2.9 cm in the mediolateral dimension.  There are no other areas of abnormal enhancement in the left breast.  No evidence for left axillary adenopathy.  There are no areas of abnormal enhancement or morphology in the right breast.    11/05/18 [facility]  MAMMOGRAM  FRANCISCA THRASHER   IMPRESSIONS: no mammographic evidence of malignancy. Stable exam. Stable post lumpectomy changes on the left. BIRADS 1 negative         Pathology:  11/27/17 [facility]     MAMMO/STERIOTACTIC LOCALIZE LT     FRANCISCA THRASHER  9 gauge Eviva.  18 core biopsy.  Tissue marker was deployed.  Post procedure mammography showed the tissue marker to be at the target site.     11/27/17 [facility] PATHOLOGY REPORT FRANCISCA THRASHER  3. Left breast lower inner quadrant at 3-4 cm from nipple: clinically with microcalcifications:  ductal carcinoma in situ, solid and cribriform types, low grade.  Negative for invasive malignancy.    4. Left breast, lower inner quadrant at 3-4 cm from nipple; clinically tissue without microcalcifications:  single focus of ductal carcinoma in situ, solid type with microcalcification identified.    11/27/17 [facility]  HORMONE  RECEPTORS  FRANCISCA THRASHER  Estrogen  100   Progesterone   100    01/11/18 [facility]  PATHOLOGY  FRANCISCA THRASHER  Final Diagnosis   LEFT BREAST LUMPECTOMY:                         DUCT CARCINOMA IN SITU, SOLID, LOW GRADE WITH ABSENCE OF NECROSIS.                          FOCALLY, DUCT CARCINOMA IN-SITU IS LESS THAN  0.5 MM FROM LATERAL MARGIN.         The following synoptic report utilizes the June 2017 CAP protocol for duct carcinoma in situ.     Procedure: Excision.  Specimen laterality: Left.  Size (extent) of DCIS: Estimated extent of DCIS is at least 8 mm.                         Comment: This measurement is based on the presence of DCIS present focally on slides from two blocks, each                         with estimated thickness of 4 mm.   Histologic type: Duct carcinoma in situ.  Nuclear grade: Grade 1 (low).  Necrosis: Not identified.  Margins: Uninvolved by duct carcinoma in situ.                         Distance from closest margin: Less than 0.5 mm from lateral margin.  Regional lymph nodes: No lymph nodes submitted or found.  Pathologic staging:                         Primary tumor: pTis(DCIS).      CMK/georgim/brb/georgim  IHC/TDJ/THM         Count from [facility] medical oncology Dr. Paniagua: Currently on hormonal blockade using tamoxifen with good tolerance.  Has a follow-up with her in 3 months.  She started tamoxifen around March 2018        Note from [facility] Doctor Davida Olivarez: Irradiation started February 2, 2018 and completed March 14, 2018.  Left breast was treated in 15 fractions.  No evidence of disease.  Return to see us in 6 months.        Note from [facility] Dr. Paniagua September 13, 2018 continue tamoxifen 20 mg a day.  Follow-up 3 months.        Procedures:      Assessment:   Diagnosis Plan   1. Ductal carcinoma in situ (DCIS) of left breast  Mammo Screening Digital Tomosynthesis Bilateral With CAD   2. FH: breast cancer     3. History of cervical cancer     4. Ulcerative  colitis without complications, unspecified location (CMS/HCC)        1-  Left breast lower inner quadrant, 6:30, 3 cm from the nipple, 6 mm of calcification clustered on her initial mammogram.  2.2 cm postbiopsy hematoma with enhancement at the perimeter of the cavity.    Clinical stage TisN 0 stage 0.    1-11-18 lumpectomy for DCIS returned as 8 mm of low-grade ductal carcinoma in situ.  Margins are uninvolved.  Distance from closest margin is less than a half millimeter laterally.    Discussed this case with Dr. Ivett Smalls due to the 100% hormone fed nature of the tumor and low grade, she did not feel that reexcision was necessary laterally.    Note from Dr. Davida Olivarez dated March 14, 2018: Radiation therapy started the second 2018 and completed March 14, 2018.  She delivered a dose of 4050 cGy in 15 fractions.     Note from Dr. Paniagua dated March 8, 2018: Start taking tamoxifen 20 mg after completion of radiation therapy.    2-  1 of 2 sisters age 73.  One niece, the daughter of cysts #2 diagnosed in late 20s.  Invitae panel test 1-8-18 negative for mutation.    3-  Cervical cancer in 1990, treated with total abdominal hysterectomy and required no chemotherapy.    4-  Ulcerative colitis diagnosed age 34, treated by Dr. Lucia at [facility].    Plan:    Amber, her  and I reviewed her interval history, imaging reports and exam together today.  There is MARYANNE on her exam or imaging.  SHe is tolerating the tamoxifen well.    I did recommend for her next mammgoram that we use 3D and she was in agreement and would like to have that done at [facility].    We will arrange that for 11-6-19 and will see her back after.    I did instruct her on massage and moisturization of her irradiated breast, and asked her to do this daily.    I asked to continue her self breast exam and to call me interim with any concerns or changes and we'd be happy to see her back sooner.      Anne Harvey MD        Next  Appointment:  Return for Next scheduled follow up, after imaging.    15 min visit, 10 in face to face   EMR Dragon/transcription disclaimer:    Much of this encounter note is an electronic transcription/translocation of spoken language to printed text.  The electronic translation of spoken language may permit erroneous, or at times, nonsensical words or phrases to be inadvertently transcribed.  Although I have reviewed the note from such areas, some may still exist.

## 2018-11-20 RX ORDER — MESALAMINE 375 MG/1
CAPSULE, EXTENDED RELEASE ORAL
Qty: 60 CAPSULE | Refills: 1 | Status: SHIPPED | OUTPATIENT
Start: 2018-11-20 | End: 2019-01-18 | Stop reason: SDUPTHER

## 2018-11-28 ENCOUNTER — OFFICE VISIT (OUTPATIENT)
Dept: GASTROENTEROLOGY | Facility: CLINIC | Age: 63
End: 2018-11-28

## 2018-11-28 VITALS
BODY MASS INDEX: 23.83 KG/M2 | TEMPERATURE: 98 F | HEIGHT: 61 IN | SYSTOLIC BLOOD PRESSURE: 136 MMHG | WEIGHT: 126.2 LBS | DIASTOLIC BLOOD PRESSURE: 82 MMHG

## 2018-11-28 DIAGNOSIS — K51.90 ULCERATIVE COLITIS WITHOUT COMPLICATIONS, UNSPECIFIED LOCATION (HCC): Primary | ICD-10-CM

## 2018-11-28 DIAGNOSIS — D84.9 IMMUNOCOMPROMISED (HCC): ICD-10-CM

## 2018-11-28 PROCEDURE — 99214 OFFICE O/P EST MOD 30 MIN: CPT | Performed by: INTERNAL MEDICINE

## 2018-11-28 NOTE — PROGRESS NOTES
Chief Complaint   Patient presents with   • Follow-up   • Ulcerative Colitis       History of Present Illness: 61 yo female with a  h/o left sided ulcerative colitis diagnosed at 34 yrs of age, on Simponi. She had breast cancer with 1/18 lumpectomy and XRT.  No diarrhea. She has 1 BM/day. No rectal bleeding. She missed one month of Simponi. She has been on Simponi for 2-3 yrs. No abdominal or chest pain. NO nausea or vomting. No fevers, chills, night sweats. Weight stable. Last dose of Simponi last mo, due for next dose 12/8/18. She is also on Apriso 1 BID and tolerates that well.         Past Medical History:   Diagnosis Date   • Anemia 2014    recovered   • Anxiety    • Breast cancer (CMS/HCC)     LEFT 2017   • Cervical cancer (CMS/HCC) 1990    LEFT BREAST 2017   • Chronic pancolonic ulcerative colitis (CMS/HCC)     DIAGNOSED 25 YEARS AGO   • History of transfusion    • Lactose intolerance    • PONV (postoperative nausea and vomiting)        Past Surgical History:   Procedure Laterality Date   • BREAST BIOPSY     • COLONOSCOPY  03/20/2015    MILD TO MODERATE PANCOLITIS W/SCARRING THROUGHOUT COLON, IH, ACUTE CRYPTITIS, ACTIVE COLITIS   • ENDOSCOPY  03/20/2015    ERYTHEMATOUS MUCOSA IN STOMACH, LEIOMYOMA, REACTIVE MUCOSAL CHANGES, CHRONIC INFLAMMATION   • HYSTERECTOMY     • UPPER GASTROINTESTINAL ENDOSCOPY  03/20/2015    COMPLETED BY DR. MATT FIGUEROA         Current Outpatient Medications:   •  APRISO 0.375 g 24 hr capsule, TAKE TWO CAPSULES BY MOUTH DAILY, Disp: 60 capsule, Rfl: 1  •  CloNIDine (CATAPRES) 0.1 MG tablet, , Disp: , Rfl:   •  escitalopram (LEXAPRO) 5 MG tablet, , Disp: , Rfl:   •  fluticasone (FLONASE) 50 MCG/ACT nasal spray, , Disp: , Rfl:   •  PARoxetine (PAXIL) 10 MG tablet, Take 10 mg by mouth Every Morning., Disp: , Rfl:   •  SIMPONI 100 MG/ML solution auto-injector, INJECT 100MG UNDER THE SKIN EVERY 4 WEEKS, Disp: 1 syringe, Rfl: 3  •  tamoxifen (NOLVADEX) 20 MG chemo tablet, , Disp: , Rfl:      Allergies   Allergen Reactions   • Contrast Dye Rash       Family History   Problem Relation Age of Onset   • Liver cancer Mother    • Breast cancer Sister 73   • Breast cancer Other 38        neice gene negative    • Cancer Sister         of unknown orgin    • Malig Hyperthermia Neg Hx        Social History     Socioeconomic History   • Marital status:      Spouse name: Not on file   • Number of children: Not on file   • Years of education: Not on file   • Highest education level: Not on file   Social Needs   • Financial resource strain: Not on file   • Food insecurity - worry: Not on file   • Food insecurity - inability: Not on file   • Transportation needs - medical: Not on file   • Transportation needs - non-medical: Not on file   Occupational History   • Not on file   Tobacco Use   • Smoking status: Never Smoker   • Smokeless tobacco: Never Used   Substance and Sexual Activity   • Alcohol use: Yes     Types: 1 Glasses of wine per week     Comment: on occ   • Drug use: No   • Sexual activity: Not Currently     Partners: Male     Birth control/protection: Abstinence, Post-menopausal, Other     Comment: Occasionally active with spouse   Other Topics Concern   • Not on file   Social History Narrative   • Not on file       Review of Systems   All other systems reviewed and are negative.      Vitals:    11/28/18 1307   BP: 136/82   Temp: 98 °F (36.7 °C)       Physical Exam   Constitutional: She is oriented to person, place, and time. She appears well-developed and well-nourished. No distress.   HENT:   Head: Normocephalic and atraumatic. Hair is normal.   Right Ear: Hearing, tympanic membrane, external ear and ear canal normal. No drainage. No decreased hearing is noted.   Left Ear: Hearing, tympanic membrane, external ear and ear canal normal. No decreased hearing is noted.   Nose: No nasal deformity.   Mouth/Throat: Oropharynx is clear and moist.   Eyes: Conjunctivae, EOM and lids are normal. Pupils are  equal, round, and reactive to light. Right eye exhibits no discharge. Left eye exhibits no discharge.   Neck: Normal range of motion. Neck supple. No JVD present. No tracheal deviation present. No thyromegaly present.   Cardiovascular: Normal rate, regular rhythm, normal heart sounds, intact distal pulses and normal pulses. Exam reveals no gallop and no friction rub.   No murmur heard.  Pulmonary/Chest: Effort normal and breath sounds normal. No respiratory distress. She has no wheezes. She has no rales. She exhibits no tenderness.   Abdominal: Soft. Bowel sounds are normal. She exhibits no distension and no mass. There is no tenderness. There is no rebound and no guarding. No hernia.   Musculoskeletal: Normal range of motion. She exhibits no edema, tenderness or deformity.   Lymphadenopathy:     She has no cervical adenopathy.   Neurological: She is alert and oriented to person, place, and time. She has normal reflexes. She displays normal reflexes. No cranial nerve deficit. She exhibits normal muscle tone. Coordination normal.   Skin: Skin is warm and dry. No rash noted. She is not diaphoretic. No erythema.   Psychiatric: She has a normal mood and affect. Her behavior is normal. Judgment and thought content normal.   Vitals reviewed.      Amber was seen today for follow-up and ulcerative colitis.    Diagnoses and all orders for this visit:    Ulcerative colitis without complications, unspecified location (CMS/[facility])  -     CBC & Differential  -     Comprehensive Metabolic Panel  -     QuantiFERON TB Gold  -     Case Request; Standing  -     Case Request    Immunocompromised (CMS/[facility])  -     CBC & Differential  -     Comprehensive Metabolic Panel  -     QuantiFERON TB Gold    Other orders  -     Follow Anesthesia Guidelines / Standing Orders; Future  -     Implement Anesthesia orders day of procedure.; Standing  -     Obtain informed consent; Standing  -     Verify bowel prep was successful; Standing  -     Give tap  water enema if bowel prep was insufficient; Standing      Assessment:  1) h/o left sided ulcerative colitis, on Simponi  2) h/o +PPD (false +) but her Quantiferon Gold has been negative.  3) H/O breast cancer.     Recommendations:  1) Continue the Simponi and Apriso  2) Schedule a colonoscopy to be done in the spring 2019.  3) Check labs today.   4) Go to Dermatologist yearly.       Return Schedule c/s in spring 2019..    Joni Lucia MD  11/28/2018

## 2018-11-29 LAB
ALBUMIN SERPL-MCNC: 4.4 G/DL (ref 3.6–4.8)
ALBUMIN/GLOB SERPL: 1.5 {RATIO} (ref 1.2–2.2)
ALP SERPL-CCNC: 49 IU/L (ref 39–117)
ALT SERPL-CCNC: 13 IU/L (ref 0–32)
AST SERPL-CCNC: 20 IU/L (ref 0–40)
BASOPHILS # BLD AUTO: 0.1 X10E3/UL (ref 0–0.2)
BASOPHILS NFR BLD AUTO: 1 %
BILIRUB SERPL-MCNC: 0.2 MG/DL (ref 0–1.2)
BUN SERPL-MCNC: 12 MG/DL (ref 8–27)
BUN/CREAT SERPL: 22 (ref 12–28)
CALCIUM SERPL-MCNC: 9.4 MG/DL (ref 8.7–10.3)
CHLORIDE SERPL-SCNC: 102 MMOL/L (ref 96–106)
CO2 SERPL-SCNC: 25 MMOL/L (ref 20–29)
CREAT SERPL-MCNC: 0.55 MG/DL (ref 0.57–1)
EOSINOPHIL # BLD AUTO: 0.1 X10E3/UL (ref 0–0.4)
EOSINOPHIL NFR BLD AUTO: 2 %
ERYTHROCYTE [DISTWIDTH] IN BLOOD BY AUTOMATED COUNT: 13.8 % (ref 12.3–15.4)
GLOBULIN SER CALC-MCNC: 2.9 G/DL (ref 1.5–4.5)
GLUCOSE SERPL-MCNC: 84 MG/DL (ref 65–99)
HCT VFR BLD AUTO: 41.6 % (ref 34–46.6)
HGB BLD-MCNC: 14.2 G/DL (ref 11.1–15.9)
IMM GRANULOCYTES # BLD: 0 X10E3/UL (ref 0–0.1)
IMM GRANULOCYTES NFR BLD: 0 %
LYMPHOCYTES # BLD AUTO: 3 X10E3/UL (ref 0.7–3.1)
LYMPHOCYTES NFR BLD AUTO: 39 %
MCH RBC QN AUTO: 32 PG (ref 26.6–33)
MCHC RBC AUTO-ENTMCNC: 34.1 G/DL (ref 31.5–35.7)
MCV RBC AUTO: 94 FL (ref 79–97)
MONOCYTES # BLD AUTO: 0.6 X10E3/UL (ref 0.1–0.9)
MONOCYTES NFR BLD AUTO: 7 %
NEUTROPHILS # BLD AUTO: 4 X10E3/UL (ref 1.4–7)
NEUTROPHILS NFR BLD AUTO: 51 %
PLATELET # BLD AUTO: 244 X10E3/UL (ref 150–379)
POTASSIUM SERPL-SCNC: 4.7 MMOL/L (ref 3.5–5.2)
PROT SERPL-MCNC: 7.3 G/DL (ref 6–8.5)
RBC # BLD AUTO: 4.44 X10E6/UL (ref 3.77–5.28)
SODIUM SERPL-SCNC: 143 MMOL/L (ref 134–144)
WBC # BLD AUTO: 7.8 X10E3/UL (ref 3.4–10.8)

## 2018-11-30 LAB
GAMMA INTERFERON BACKGROUND BLD IA-ACNC: 0.05 IU/ML
M TB IFN-G BLD-IMP: NEGATIVE
M TB IFN-G CD4+ BCKGRND COR BLD-ACNC: 0.04 IU/ML
MITOGEN IGNF BLD-ACNC: >10 IU/ML
QUANTIFERON INCUBATION: NORMAL
QUANTIFERON TB2 AG VALUE: 0.05 IU/ML
SERVICE CMNT-IMP: NORMAL

## 2018-12-03 ENCOUNTER — TELEPHONE (OUTPATIENT)
Dept: GASTROENTEROLOGY | Facility: CLINIC | Age: 63
End: 2018-12-03

## 2018-12-03 NOTE — TELEPHONE ENCOUNTER
----- Message from Joni Lucia MD sent at 12/1/2018  2:33 PM EST -----  Tell her that her labs look normal and her TB test (Quantiferon Gold) came back negative, which is good. Thx. kjh

## 2018-12-03 NOTE — TELEPHONE ENCOUNTER
Called pt and advised per Dr Lucia that her labs look normal and her tb test came back neg which is good. Pt verb understanding.

## 2018-12-31 RX ORDER — GOLIMUMAB 100 MG/ML
INJECTION, SOLUTION SUBCUTANEOUS
Qty: 1 SYRINGE | Refills: 3 | Status: SHIPPED | OUTPATIENT
Start: 2018-12-31 | End: 2019-04-17 | Stop reason: SDUPTHER

## 2019-01-04 NOTE — TELEPHONE ENCOUNTER
Escribe request received from [facility] message via [employer] re:simponi refill.    Call to spouse, Jorge (see hipaa).  Advise that filled on 12/31 - Jorge [facility] has received notification from [facility] that should receive shipment today.

## 2019-01-07 ENCOUNTER — TELEPHONE (OUTPATIENT)
Dept: GASTROENTEROLOGY | Facility: CLINIC | Age: 64
End: 2019-01-07

## 2019-01-07 NOTE — TELEPHONE ENCOUNTER
Regarding: Prescription Question  Contact: [phone]  ----- Message from [employer] sent at 1/5/2019  8:57 AM EST -----    I don't understand this last message regarding the Simponi 100. The prescription was approved on December 31st and then changed to not approved on 1/4/19.    We did receive our monthly injection on 1/4/2019 from [facility] thanks to the December 31st approval of the prescription. Hopefully the latest message showing the non-approval will not interfere with [facility] sending next months dosage.    If this needs to be acted upon please do so.      Thank you

## 2019-01-07 NOTE — TELEPHONE ENCOUNTER
Call to [facility] @ [phone] and spoke with Kait.  Rx of 12/31 active with 3 refills.      Call to spouse, Jorge (see hip[employer] auth) to advise of above.  Verb understanding.

## 2019-01-15 ENCOUNTER — TELEPHONE (OUTPATIENT)
Dept: SURGERY | Facility: CLINIC | Age: 64
End: 2019-01-15

## 2019-01-15 NOTE — TELEPHONE ENCOUNTER
Note dated December 13, 2018 [facility]: Patient to be evaluated by Dr. Angelo gynecology for vaginal bleeding.  Stopped the tamoxifen.  Started patient on Aromasin along with calcium and vitamin D.  Follow-up in 3 months.

## 2019-01-25 RX ORDER — MESALAMINE 375 MG/1
CAPSULE, EXTENDED RELEASE ORAL
Qty: 60 CAPSULE | Refills: 3 | Status: SHIPPED | OUTPATIENT
Start: 2019-01-25 | End: 2019-05-24 | Stop reason: SDUPTHER

## 2019-04-22 NOTE — TELEPHONE ENCOUNTER
Please prescribe the Simponi as previously. Also, when is she going to have a colonsocopy? It was supposed to be done this Spring. [email or web address]

## 2019-04-23 ENCOUNTER — PREP FOR SURGERY (OUTPATIENT)
Dept: OTHER | Facility: HOSPITAL | Age: 64
End: 2019-04-23

## 2019-04-23 DIAGNOSIS — K51.90 UC (ULCERATIVE COLITIS) (HCC): Primary | ICD-10-CM

## 2019-04-23 RX ORDER — GOLIMUMAB 100 MG/ML
INJECTION, SOLUTION SUBCUTANEOUS
Qty: 1 SYRINGE | Refills: 3 | Status: SHIPPED | OUTPATIENT
Start: 2019-04-23 | End: 2021-02-19 | Stop reason: SDUPTHER

## 2019-04-23 NOTE — TELEPHONE ENCOUNTER
Pt called and advised per Dr Lucia that her simponi has been refilled and Dr Lucia is asking if she has her c/s scheduled.  Pt states it is not yet scheduled but she is ready to have it done.  Advised would send message to Dr Lucia for c/s case request. Pt verb understanding.

## 2019-04-29 PROBLEM — K51.90 UC (ULCERATIVE COLITIS) (HCC): Status: ACTIVE | Noted: 2019-04-29

## 2019-04-30 ENCOUNTER — PRIOR AUTHORIZATION (OUTPATIENT)
Dept: GASTROENTEROLOGY | Facility: CLINIC | Age: 64
End: 2019-04-30

## 2019-05-24 RX ORDER — MESALAMINE 375 MG/1
CAPSULE, EXTENDED RELEASE ORAL
Qty: 60 CAPSULE | Refills: 2 | Status: SHIPPED | OUTPATIENT
Start: 2019-05-24 | End: 2019-08-20 | Stop reason: SDUPTHER

## 2019-05-30 ENCOUNTER — TELEPHONE (OUTPATIENT)
Dept: GASTROENTEROLOGY | Facility: CLINIC | Age: 64
End: 2019-05-30

## 2019-05-30 NOTE — TELEPHONE ENCOUNTER
Faxed request received from [facility] for Simponi.  See rx of 4/23/19, #1, R3 to [facility].      Call to pt- spoke with spouse, Jorge, per request.     States can now obtain Simponi thru [facility] for no cost.  Would like to change pharmacies.      Form to Dr Lucia.

## 2019-05-31 NOTE — TELEPHONE ENCOUNTER
Completed refill faxed to [facility] @ [phone] - confirmation received (see media tab).

## 2019-06-14 ENCOUNTER — ANESTHESIA (OUTPATIENT)
Dept: GASTROENTEROLOGY | Facility: HOSPITAL | Age: 64
End: 2019-06-14

## 2019-06-14 ENCOUNTER — ANESTHESIA EVENT (OUTPATIENT)
Dept: GASTROENTEROLOGY | Facility: HOSPITAL | Age: 64
End: 2019-06-14

## 2019-06-14 ENCOUNTER — HOSPITAL ENCOUNTER (OUTPATIENT)
Facility: HOSPITAL | Age: 64
Setting detail: HOSPITAL OUTPATIENT SURGERY
Discharge: HOME | End: 2019-06-14
Attending: INTERNAL MEDICINE | Admitting: INTERNAL MEDICINE

## 2019-06-14 VITALS
BODY MASS INDEX: 23.94 KG/M2 | HEIGHT: 61 IN | TEMPERATURE: 97.7 F | WEIGHT: 126.8 LBS | SYSTOLIC BLOOD PRESSURE: 109 MMHG | RESPIRATION RATE: 20 BRPM | OXYGEN SATURATION: 97 % | HEART RATE: 66 BPM | DIASTOLIC BLOOD PRESSURE: 63 MMHG

## 2019-06-14 DIAGNOSIS — K51.90 UC (ULCERATIVE COLITIS) (HCC): ICD-10-CM

## 2019-06-14 PROCEDURE — 25010000002 PROPOFOL 10 MG/ML EMULSION: Performed by: ANESTHESIOLOGY

## 2019-06-14 PROCEDURE — 45380 COLONOSCOPY AND BIOPSY: CPT | Performed by: INTERNAL MEDICINE

## 2019-06-14 PROCEDURE — 88305 TISSUE EXAM BY PATHOLOGIST: CPT | Performed by: INTERNAL MEDICINE

## 2019-06-14 RX ORDER — PROPOFOL 10 MG/ML
VIAL (ML) INTRAVENOUS AS NEEDED
Status: DISCONTINUED | OUTPATIENT
Start: 2019-06-14 | End: 2019-06-14 | Stop reason: SURG

## 2019-06-14 RX ORDER — LIDOCAINE HYDROCHLORIDE 20 MG/ML
INJECTION, SOLUTION INFILTRATION; PERINEURAL AS NEEDED
Status: DISCONTINUED | OUTPATIENT
Start: 2019-06-14 | End: 2019-06-14 | Stop reason: SURG

## 2019-06-14 RX ORDER — PROPOFOL 10 MG/ML
VIAL (ML) INTRAVENOUS CONTINUOUS PRN
Status: DISCONTINUED | OUTPATIENT
Start: 2019-06-14 | End: 2019-06-14 | Stop reason: SURG

## 2019-06-14 RX ORDER — PHENOL 1.4 %
600 AEROSOL, SPRAY (ML) MUCOUS MEMBRANE DAILY
COMMUNITY

## 2019-06-14 RX ORDER — SODIUM CHLORIDE, SODIUM LACTATE, POTASSIUM CHLORIDE, CALCIUM CHLORIDE 600; 310; 30; 20 MG/100ML; MG/100ML; MG/100ML; MG/100ML
1000 INJECTION, SOLUTION INTRAVENOUS CONTINUOUS
Status: DISCONTINUED | OUTPATIENT
Start: 2019-06-14 | End: 2019-06-14 | Stop reason: HOSPADM

## 2019-06-14 RX ADMIN — LIDOCAINE HYDROCHLORIDE 50 MG: 20 INJECTION, SOLUTION INFILTRATION; PERINEURAL at 12:52

## 2019-06-14 RX ADMIN — PROPOFOL 125 MG: 10 INJECTION, EMULSION INTRAVENOUS at 12:52

## 2019-06-14 RX ADMIN — PROPOFOL 140 MCG/KG/MIN: 10 INJECTION, EMULSION INTRAVENOUS at 12:52

## 2019-06-14 RX ADMIN — SODIUM CHLORIDE, POTASSIUM CHLORIDE, SODIUM LACTATE AND CALCIUM CHLORIDE 1000 ML: 600; 310; 30; 20 INJECTION, SOLUTION INTRAVENOUS at 11:39

## 2019-06-14 NOTE — DISCHARGE INSTRUCTIONS
Dr. Lucia   [phone]    Colonoscopy, Adult, Care After    This sheet gives you information about how to care for yourself after your procedure. Your health care provider may also give you more specific instructions. If you have problems or questions, contact your health care provider.  What can I expect after the procedure?  After the procedure, it is common to have:  · A small amount of blood in your stool for 24 hours after the procedure.  · Some gas.  · Mild abdominal cramping or bloating.    Follow these instructions at home:  General instructions    · For the first 24 hours after the procedure:  ? Do not drive or use machinery.  ? Do not sign important documents.  ? Do not drink alcohol.  ? Do your regular daily activities at a slower pace than normal.  ? Eat soft, easy-to-digest foods.  ? Rest often.  · Take over-the-counter or prescription medicines only as told by your health care provider.  · It is up to you to get the results of your procedure. Ask your health care provider, or the department performing the procedure, when your results will be ready.  Relieving cramping and bloating  · Try walking around when you have cramps or feel bloated.  · Apply heat to your abdomen as told by your health care provider. Use a heat source that your health care provider recommends, such as a moist heat pack or a heating pad.  ? Place a towel between your skin and the heat source.  ? Leave the heat on for 20-30 minutes.  ? Remove the heat if your skin turns bright red. This is especially important if you are unable to feel pain, heat, or cold. You may have a greater risk of getting burned.  Eating and drinking  · Drink enough fluid to keep your urine clear or pale yellow.  · Resume your normal diet as instructed by your health care provider. Avoid heavy or fried foods that are hard to digest.  · Avoid drinking alcohol for as long as instructed by your health care provider.  Contact a health care provider if:  · You have  blood in your stool 2-3 days after the procedure.  Get help right away if:  · You have more than a small spotting of blood in your stool.  · You pass large blood clots in your stool.  · Your abdomen is swollen.  · You have nausea or vomiting.  · You have a fever.  · You have increasing abdominal pain that is not relieved with medicine.  This information is not intended to replace advice given to you by your health care provider. Make sure you discuss any questions you have with your health care provider.  Document Released: 08/01/2005 Document Revised: 09/11/2017 Document Reviewed: 02/28/2017  [employer] Interactive Patient Education © 2018 [employer]    Proctitis    Proctitis is swelling and soreness (inflammation) of the lining of the rectum. The rectum is at the end of the large intestine, and it leads to the anus. The inflammation causes pain and discomfort. It may be a short-term (acute) or long-lasting (chronic) problem.  What are the causes?  This condition may be caused by:  · STDs (sexually transmitted diseases).  · Infection.  · Trauma or injury to the anus or rectum.  · Ulcerative colitis or Crohn disease.  · Radiation therapy that is directed near the rectum.  · Antibiotic therapy.    What are the signs or symptoms?  Symptoms of this condition include:  · Sudden, uncomfortable, and frequent urge to have a bowel movement.  · Anal pain or rectal pain.  · Pain or cramping in the abdomen.  · Sensation that the rectum is full.  · Rectal bleeding.  · Pus or mucus discharge from the anus.  · Diarrhea or frequent soft, loose stools.  · Constipation.  · Pain with bowel movements.    How is this diagnosed?  This condition may be diagnosed based on:  · A medical history and physical exam.  · Various tests, such as:  ? An STD test.  ? Blood tests.  ? Stool tests.  ? Rectal culture.  ? A procedure to evaluate the anal canal (anoscopy).  ? Procedures to look at the entire large bowel or part of it (colonoscopy or  sigmoidoscopy).    How is this treated?  Treatment for this condition depends on the cause. The main goals of treatment are to reduce the symptoms of inflammation and to get rid of any infection. Treatment may include:  · Home remedies and lifestyle changes, such as sitz baths and avoiding food right before bedtime.  · Medicines, such as:  ? Topical ointments, foams, suppositories, or enemas, such as corticosteroids or anti-inflammatories.  ? Antibiotic or antiviral medicines to treat infection or to control harmful bacteria.  ? Medicines to control diarrhea, soften stools, and reduce pain.  ? Medicines to suppress the immune system.  · Nutritional, dietary, or herbal supplements.  · Avoiding the activity that caused rectal trauma.  · Heat or laser therapy for persistent bleeding.  · A dilation procedure to enlarge a narrowed rectum.  · Surgery to repair damaged rectal lining. This is rare.    If your proctitis was caused by an STD, your health care provider may test you for infection again 3 months after treatment.  Follow these instructions at home:  · Take over-the-counter and prescription medicines only as told by your health care provider.  · If you were prescribed an antibiotic medicine, take it as told by your health care provider. Do not stop taking the antibiotic even if you start to feel better.  · Try to avoid eating right before bedtime.  · Take sitz baths as told by your health care provider.  · Keep all follow-up visits as told by your health care provider. This is important.  Contact a health care provider if:  · Your symptoms do not improve with treatment.  · Your symptoms get worse.  · You have a fever.  This information is not intended to replace advice given to you by your health care provider. Make sure you discuss any questions you have with your health care provider.  Document Released: 12/06/2012 Document Revised: 05/25/2017 Document Reviewed: 03/14/2016  [employer] Interactive Patient Education  © 2019 [employer]

## 2019-06-14 NOTE — ANESTHESIA POSTPROCEDURE EVALUATION
Patient: Amber Sesay    Procedure Summary     Date:  06/14/19 Room / Location:   [facility]    Anesthesia Start:  1244 Anesthesia Stop:  1309    Procedure:  COLONOSCOPY TO CECUM AND TI WITH BX'S (N/A ) Diagnosis:       UC (ulcerative colitis) (CMS/HCC)      (UC (ulcerative colitis) (CMS/HCC) [K51.90])    Surgeon:  Joni Lucia MD Provider:  Jadyen Keys MD    Anesthesia Type:  MAC ASA Status:  1          Anesthesia Type: MAC  Last vitals  BP   109/63 (06/14/19 1333)   Temp   36.5 °C (97.7 °F) (06/14/19 1122)   Pulse   66 (06/14/19 1333)   Resp   20 (06/14/19 1333)     SpO2   97 % (06/14/19 1333)     Post Anesthesia Care and Evaluation    Patient location during evaluation: bedside  Patient participation: complete - patient participated  Level of consciousness: awake and alert  Pain score: 0  Pain management: adequate  Airway patency: patent  Anesthetic complications: No anesthetic complications  PONV Status: none  Cardiovascular status: acceptable  Respiratory status: acceptable  Hydration status: acceptable  Post Neuraxial Block status: Motor and sensory function returned to baseline

## 2019-06-14 NOTE — H&P
[facility]  Pre Procedure History & Physical    Chief Complaint:   62 yo female with a h/o ulcerative colitis diagnosed 30+ yrs ago. She is on Simponi and Apriso one BID. She last had a colonoscopy in 4/17.     Subjective     HPI:   63 y.o. female with a h/o ulcerative colitis diagnosed 30+ yrs ago. She is on Simponi and Apriso one BID. She last had a colonoscopy in 4/17.         Past Medical History:   Past Medical History:   Diagnosis Date   • Anemia 2014    recovered   • Anxiety    • Breast cancer (CMS/HCC)     LEFT 2017   • Cervical cancer (CMS/HCC) 1990    LEFT BREAST 2017   • Chronic pancolonic ulcerative colitis (CMS/HCC)     DIAGNOSED 25 YEARS AGO   • History of transfusion    • Lactose intolerance    • PONV (postoperative nausea and vomiting)        Family History:  Family History   Problem Relation Age of Onset   • Liver cancer Mother    • Breast cancer Sister 73   • Breast cancer Other 38        neice gene negative    • Cancer Sister         of unknown orgin    • Malig Hyperthermia Neg Hx        Social History:   reports that she has never smoked. She has never used smokeless tobacco. She reports that she drinks alcohol. She reports that she does not use drugs.    Medications:   Medications Prior to Admission   Medication Sig Dispense Refill Last Dose   • APRISO 0.375 g 24 hr capsule TAKE TWO CAPSULES BY MOUTH DAILY 60 capsule 2 6/13/2019 at Unknown time   • calcium carbonate (OS-LIANE) 600 MG tablet Take 600 mg by mouth Daily.   6/12/2019   • Cholecalciferol (VITAMIN D3) 5000 units capsule capsule Take 5,000 Units by mouth Daily.   6/12/2019   • escitalopram (LEXAPRO) 5 MG tablet    6/13/2019 at Unknown time   • fluticasone (FLONASE) 50 MCG/ACT nasal spray    6/14/2019 at Unknown time   • CloNIDine (CATAPRES) 0.1 MG tablet    6/12/2019   • PARoxetine (PAXIL) 10 MG tablet Take 10 mg by mouth Every Morning.   6/12/2019   • SIMPONI 100 MG/ML solution auto-injector INJECT 100MG UNDER  "THE SKIN EVERY 4 WEEKS 1 syringe 3 5/31/2019   • tamoxifen (NOLVADEX) 20 MG chemo tablet    6/12/2019       Allergies:  Contrast dye    ROS:    Pertinent items are noted in HPI     Objective     Blood pressure 127/76, pulse 64, temperature 97.7 °F (36.5 °C), temperature source Oral, resp. rate 22, height 154.9 cm (61\"), weight 57.5 kg (126 lb 12.8 oz), SpO2 95 %.    Physical Exam   Constitutional: Pt is oriented to person, place, and time and well-developed, well-nourished, and in no distress.   HENT:   Mouth/Throat: Oropharynx is clear and moist.   Neck: Normal range of motion. Neck supple.   Cardiovascular: Normal rate, regular rhythm and normal heart sounds.    Pulmonary/Chest: Effort normal and breath sounds normal. No respiratory distress. No  wheezes.   Abdominal: Soft. Bowel sounds are normal.   Skin: Skin is warm and dry.   Psychiatric: Mood, memory, affect and judgment normal.     Assessment/Plan     Diagnosis:  63 y.o. female with a h/o ulcerative colitis diagnosed 30+ yrs ago. She is on Simponi and Apriso one BID. She last had a colonoscopy in 4/17.     Anticipated Surgical Procedure:  Colonoscopy    The risks, benefits, and alternatives of this procedure have been discussed with the patient or the responsible party- the patient understands and agrees to proceed.                                                                "

## 2019-06-17 LAB
CYTO UR: NORMAL
LAB AP CASE REPORT: NORMAL
LAB AP DIAGNOSIS COMMENT: NORMAL
PATH REPORT.FINAL DX SPEC: NORMAL
PATH REPORT.GROSS SPEC: NORMAL

## 2019-06-20 ENCOUNTER — TELEPHONE (OUTPATIENT)
Dept: GASTROENTEROLOGY | Facility: CLINIC | Age: 64
End: 2019-06-20

## 2019-06-20 NOTE — TELEPHONE ENCOUNTER
----- Message from Joni Lucia MD sent at 6/17/2019  7:43 PM EDT -----  Tell her that her colon biopsies were consistent with mildly active ulcerative colitis. No dysplasia was seen, which is good. I recommend a repeat colonoscopy in 2 yrs. Continue taking Simponi and Apriso. FAX a copy of this report to her PCP. Shanae. [email or web address]

## 2019-06-20 NOTE — TELEPHONE ENCOUNTER
Called pt and spoke with pt's  who is on [employer] form.  Advised per Dr Lucia that the colon bx were consistent with mildly active uc.  No dysplasia was seen , which is good.  He recommends a repeat c/s in 2 yrs.  Continue simponi and apriso.      Pt's  verb understanding.     C/s placed in recall for 06/14/2021. And f/u appt made for  1209 at 1030a with Dr Lucia.

## 2019-08-07 ENCOUNTER — TELEPHONE (OUTPATIENT)
Dept: SURGERY | Facility: CLINIC | Age: 64
End: 2019-08-07

## 2019-08-07 NOTE — TELEPHONE ENCOUNTER
Scheduled Bilateral Screen 3D Mammo  @ [facility] 11-11-19 @ 10:20    Return to see Dr MELO 11-19-19  Arrive 10:15      Spoke to pt she v/u with appts  [email or web address]

## 2019-08-20 RX ORDER — MESALAMINE 375 MG/1
CAPSULE, EXTENDED RELEASE ORAL
Qty: 60 CAPSULE | Refills: 4 | Status: SHIPPED | OUTPATIENT
Start: 2019-08-20 | End: 2020-01-16

## 2019-11-13 ENCOUNTER — TELEPHONE (OUTPATIENT)
Dept: SURGERY | Facility: CLINIC | Age: 64
End: 2019-11-13

## 2019-11-18 ENCOUNTER — TELEPHONE (OUTPATIENT)
Dept: GASTROENTEROLOGY | Facility: CLINIC | Age: 64
End: 2019-11-18

## 2019-11-18 DIAGNOSIS — K51.90 ULCERATIVE COLITIS WITHOUT COMPLICATIONS, UNSPECIFIED LOCATION (HCC): Primary | ICD-10-CM

## 2019-11-18 NOTE — TELEPHONE ENCOUNTER
Called [employer] rep Anisa Parmarron at [phone] who advised she is no longer the rep.  She gave name of Bryant Montez [phone].  Called Bryant who advised that in Jan pt can try and get new pa and have pt have the injections done at out pt setting.     Called [facility] who advised I would needs to speak with pharmacy director Verónica Trent.  Called her at [phone] and left vm for her to call back.

## 2019-11-18 NOTE — TELEPHONE ENCOUNTER
Regarding: Prescription Question  Contact: [phone]  ----- Message from Mychart, Generic sent at 11/18/2019 10:28 AM EST -----    Amber Madison is turning 64 on December 10th. She will now be going on to Medicare and will have a new supplement plan G ([place] [employer]) with her prescriptions, plan D, going through ([employer]).  This change in status has removed Amber from [facility] thus ending our coverage of the Simponi 100. We did not qualify for one of the assistant programs and are now waiting to call the State Health Insurance Program after open enrollment in December to see if there is any help there.  Our question is what are your patients doing to qualify for affordable biologic medicine on Medicare/Medicare Supplement? We can't afford the $5,000 + per month cost of this biologic.  Thank you for your help.

## 2019-11-19 NOTE — TELEPHONE ENCOUNTER
Called Verónica at [facility] pharmacy who advised that infusions and injections have been moved to [place] and we need to call [phone] and speak with pharmacist Jayden Khalil.  He is going to check and see if this can be run under the medical side of the insurance not the pharmacy side.     Called pt and advised of the above.  Advised once we hear back from them we will call her. Pt verb understanding.

## 2019-11-21 NOTE — TELEPHONE ENCOUNTER
Renville from Cassidy Caldera pharmacist from [facility] who advised that getting the injections covered at [facility] should not be a problem.     Called pt's  and advised of the above.  ADvised his wife instead of getting injection at home will have to come to [facility] once a month , to get it covered.  Advised will need copy of her new insurance cards. Once we have the cards we can begin working on pa.   Pt's  verb understanding and will email us the cards.

## 2019-11-29 ENCOUNTER — TELEPHONE (OUTPATIENT)
Dept: SURGERY | Facility: CLINIC | Age: 64
End: 2019-11-29

## 2019-11-29 NOTE — TELEPHONE ENCOUNTER
Note from Dr. Paniagua dated September 12, 2019.  Patient was started on tamoxifen March 2018, then was switched to Aromasin due to intermittent vaginal bleeding.  Was on Aromasin from November 2018 through June 2019.  At that time due to mood swings and depressive symptoms with switch back to tamoxifen.  Continue tamoxifen 20 mg a day follow-up in 3 months.

## 2019-12-02 ENCOUNTER — OFFICE VISIT (OUTPATIENT)
Dept: SURGERY | Facility: CLINIC | Age: 64
End: 2019-12-02

## 2019-12-02 VITALS
WEIGHT: 127 LBS | BODY MASS INDEX: 23.98 KG/M2 | HEIGHT: 61 IN | OXYGEN SATURATION: 95 % | HEART RATE: 79 BPM | DIASTOLIC BLOOD PRESSURE: 81 MMHG | SYSTOLIC BLOOD PRESSURE: 127 MMHG

## 2019-12-02 DIAGNOSIS — D05.12 DUCTAL CARCINOMA IN SITU (DCIS) OF LEFT BREAST: Primary | ICD-10-CM

## 2019-12-02 DIAGNOSIS — Z85.41 HISTORY OF CERVICAL CANCER: ICD-10-CM

## 2019-12-02 DIAGNOSIS — Z80.3 FH: BREAST CANCER: ICD-10-CM

## 2019-12-02 DIAGNOSIS — K51.90 ULCERATIVE COLITIS WITHOUT COMPLICATIONS, UNSPECIFIED LOCATION (HCC): ICD-10-CM

## 2019-12-02 PROCEDURE — 99212 OFFICE O/P EST SF 10 MIN: CPT | Performed by: SURGERY

## 2019-12-02 NOTE — PROGRESS NOTES
Chief Complaint: Amber Thrasher is a 63 y.o. female who was seen in consultation at the request of Dora Ross MD  for newly diagnosed breast cancer and a followup visit    History of Present Illness:  Patient presents with newly diagnosed DCIS, . She noted no new masses, skin changes, nipple discharge, nipple changes prior to her most recent imaging.    Her most recent imaging includes the following:   10/26/16  [facility]      MAMMO/ SCREENING MAMMO AMBER THRASHER  Heterogeneously dense.  BIRADS category 1. Negative     10/31/2017     Psychiatric  MAMMO SCREENING  AMBER THRASHER  There is a new group of microcalcifications lower inner left breast 3-4 cm from the nipple.   BIRADS category 0.    11/16/2017 Psych[facility]     MAMMO/DIAG UNT LT DIGITAL AMBER THRASHER  Group of pleomorphic microcalcifications lower inner left breast 6:00 to 7:00 3-4 cm from the nipple.  BIRADS category 4        She had a biopsy on the following day that showed:   11/27/17 [facility]     MAMMO/STERIOTACTIC LOCALIZE LT     AMBER THRASHER  9 gauge Eviva.  18 core biopsy.  Tissue marker was deployed.  Post procedure mammography showed the tissue marker to be at the target site.     11/27/17 [facility] PATHOLOGY REPORT AMBER THRASHER  1. Left breast lower inner quadrant at 3-4 cm from nipple: clinically with microcalcifications:  ductal carcinoma in situ, solid and cribriform types, low grade.  Negative for invasive malignancy.    2. Left breast, lower inner quadrant at 3-4 cm from nipple; clinically tissue without microcalcifications:  single focus of ductal carcinoma in situ, solid type with microcalcification identified.    11/27/17 [facility]  HORMONE RECEPTORS  AMBER THRASHER  Estrogen  100   Progesterone   100       I then arranged for a breast MRI:  12/07/17 [facility] BREAST MRI  AMBER THRASHER  Left breast 6’oclock the order of 5cm from the nipple there is a metallic  clip within a postbiopsy cavity.  There are some thin linear enhancement seen along the anterior and posterior margins of the cavity.  The cavity and associated thin linear enhancement measured 2.2 cm in anterior to posterior dimension, 1.5 cm in the superior to inferior dimension and 2.9 cm in the mediolateral dimension.  There are no other areas of abnormal enhancement in the left breast.  No evidence for left axillary adenopathy.  There are no areas of abnormal enhancement or morphology in the right breast.    She has not had a breast biopsy in the past.  She has her ovaries, she had her uterus removed in 1990 for cervical cancer.      Invitae panel test 1-8-18 negative for mutation.    1-11-7 lumpectomy for DCIS returned as 8 mm of low-grade ductal carcinoma in situ.  Margins are uninvolved.  Distance from closest margin is less than a half millimeter laterally.    Discussed this case with Dr. vIett Smalls due to the 100% hormone fed nature of the tumor and low grade, she did not feel that reexcision was necessary laterally.    In the interim,  Amber Sesay  has done well.  She has noted no changes in her breast exam. No new masses, skin changes, nipple changes, nipple discharge either breast.   She denies headache, bone pain, belly pain, cough, changes in vision or gait.    Her most recent imaging includes the following:  [place] bilateral screening mammogram November 5, 2018.      Note from genetics dated December 31, 2017 invitae panel test negative for any mutations.     Note from Dr. Davida Olviarez dated March 14, 2018: Radiation therapy started the second 2018 and completed March 14, 2018.  She delivered a dose of 4050 cGy in 15 fractions.     Note from Dr. Paniagua dated March 8, 2018: Start taking tamoxifen 20 mg after completion of radiation therapy.        Interval History:    In the interim,  Amber Sesay  has done well.  She noted some vaginal bleeding in December 2018 so she was sent to  see the gynecologist Dr. Angelo who felt like everything was okay based on her imaging and exam.  Dr. Paniagua changed her over to Aromasin however Amber noticed mood disturbances, depression.  Therefore she stopped the Aromasin and went back onto the tamoxifen and has had no further bleeding.    She has noted no changes in her breast exam. No new masses, skin changes, nipple changes, nipple discharge either breast.   She denies headache, bone pain, belly pain, cough, changes in vision or gait.  Her most recent imaging includes the following:  [facility] November 11, 2019 bilateral screening mammogram with 3D.  The breast parenchyma is heterogenous Mendel dense.  BI-RADS 2    She is here for review.  She is gained 4 pounds in the interim.    Review of Systems:  Review of Systems   Constitutional: Positive for unexpected weight change (4 lb wt gain).   All other systems reviewed and are negative.       Past Medical and Surgical History:  Breast Biopsy History:  Patient had not had a breast biopsy prior to her cancer diagnosis.  Breast Cancer HIstory:  Patient does not have a past medical history of breast cancer.  Breast Operations, and year:  None   Obstetric/Gynecologic History:  Age menstrual periods began:teens   Patient is postmenopausal due to removal of her uterus in the following year:1990   Number of pregnancies:3  Number of live births: 2  Number of abortions or miscarriages: 1  Age of delivery of first child: 22  Patient did not breast feed.  Length of time taking birth control pills: 7 yrs   Patient has never taken hormone replacement  Patient had uterus removed 1990    Past Surgical History:   Procedure Laterality Date   • BREAST BIOPSY     • BREAST LUMPECTOMY Left 1/11/2018    Procedure: LEFT needle localized lumpectomy;  Surgeon: Anne Harvey MD;  [place] [facility] [place] [facility] [place]  [place]:    • COLONOSCOPY  03/20/2015    MILD TO MODERATE PANCOLITIS W/SCARRING THROUGHOUT COLON, IH, ACUTE CRYPTITIS, ACTIVE  COLITIS   • COLONOSCOPY N/A 4/25/2017    Lots of scarring throughout the colon from her long h/o of ulcerative colitis, mild left sided ulcerative colitis, IH.  PATH: mild chronic active colitis.     • COLONOSCOPY N/A 6/14/2019    ileum was normal, episodic scarring throughout the colon, mild colitis in the rectum, IH   • ENDOSCOPY  03/20/2015    ERYTHEMATOUS MUCOSA IN STOMACH, LEIOMYOMA, REACTIVE MUCOSAL CHANGES, CHRONIC INFLAMMATION   • HYSTERECTOMY     • UPPER GASTROINTESTINAL ENDOSCOPY  03/20/2015    COMPLETED BY DR. MATT FIGUEROA       Past Medical History:   Diagnosis Date   • Anemia 2014    recovered   • Anxiety    • Breast cancer (CMS/HCC)     LEFT 2017   • Cervical cancer (CMS/HCC) 1990    LEFT BREAST 2017   • Chronic pancolonic ulcerative colitis (CMS/HCC)     DIAGNOSED 25 YEARS AGO   • History of transfusion    • Lactose intolerance    • PONV (postoperative nausea and vomiting)        Prior Hospitalizations, other than for surgery or childbirth, and year:  Ulcerative colitis-Anemic     Social History     Socioeconomic History   • Marital status:      Spouse name: Not on file   • Number of children: Not on file   • Years of education: Not on file   • Highest education level: Not on file   Tobacco Use   • Smoking status: Never Smoker   • Smokeless tobacco: Never Used   Substance and Sexual Activity   • Alcohol use: Yes     Types: 1 Glasses of wine per week     Comment: on occ   • Drug use: No   • Sexual activity: Not Currently     Partners: Male     Birth control/protection: Abstinence, Post-menopausal, Other     Comment: Occasionally active with spouse     Patient is .  Patient is unemployed.  Patient drinks 2 servings of caffeine per day.    Family History:  Family History   Problem Relation Age of Onset   • Liver cancer Mother    • Breast cancer Sister 73   • Breast cancer Other 38        neice gene negative    • Cancer Sister         of unknown orgin    • Malig Hyperthermia Neg Hx   "      Vital Signs:  /81 (BP Location: Right arm, Patient Position: Sitting, Cuff Size: Adult)   Pulse 79   Ht 154.9 cm (61\")   Wt 57.6 kg (127 lb)   LMP  (LMP Unknown)   SpO2 95%   Breastfeeding? No   BMI 24.00 kg/m²      Medications:    Current Outpatient Medications:   •  APRISO 0.375 g 24 hr capsule, TAKE TWO CAPSULES BY MOUTH DAILY, Disp: 60 capsule, Rfl: 4  •  calcium carbonate (OS-LIANE) 600 MG tablet, Take 600 mg by mouth Daily., Disp: , Rfl:   •  Cholecalciferol (VITAMIN D3) 5000 units capsule capsule, Take 5,000 Units by mouth Daily., Disp: , Rfl:   •  escitalopram (LEXAPRO) 5 MG tablet, , Disp: , Rfl:   •  fluticasone (FLONASE) 50 MCG/ACT nasal spray, , Disp: , Rfl:   •  SIMPONI 100 MG/ML solution auto-injector, INJECT 100MG UNDER THE SKIN EVERY 4 WEEKS, Disp: 1 syringe, Rfl: 3  •  tamoxifen (NOLVADEX) 20 MG chemo tablet, , Disp: , Rfl:      Allergies:  Allergies   Allergen Reactions   • Contrast Dye Rash       Physical Examination:  /81 (BP Location: Right arm, Patient Position: Sitting, Cuff Size: Adult)   Pulse 79   Ht 154.9 cm (61\")   Wt 57.6 kg (127 lb)   LMP  (LMP Unknown)   SpO2 95%   Breastfeeding? No   BMI 24.00 kg/m²   General Appearance:  Patient is in no distress.  She is well kept and has an average build.   Psychiatric:  Patient with appropriate mood and affect. Alert and oriented to self, time, and place.    Breast, RIGHT:  small sized, symmetric with the contralateral side.  Breast skin is without erythema, edema, rashes.  There are no visible abnormalities upon inspection during the arm-raising maneuver or with hands on hips in the sitting position. There is no nipple retraction, discharge or nipple/areolar skin changes.There are no masses palpable in the sitting or supine positions.    Breast, LEFT:  small sized, symmetric with the contralateral side.  Breast skin is without erythema, edema, rashes. Thre is some hyperpigmentation from her radiation. There are no " visible abnormalities upon inspection during the arm-raising maneuver or with hands on hips in the sitting position. There is no nipple retraction, discharge or nipple/areolar skin changes.There are no masses palpable in the sitting or supine positions. There Is a well-healed radial incision inferiorly at 6:00  from her January 2018 lumpectomy for DCIS.    Lymphatic:  There is no axillary, cervical, infraclavicular, or supraclavicular adenopathy bilaterally.  Eyes:  Pupils are round and reactive to light.  Cardiovascular:  Heart rate and rhythm are regular.  Respiratory:  Lungs are clear bilaterally with no crackles or wheezes in any lung field.  Gastrointestinal:  Abdomen is soft, nondistended, and nontender.     Musculoskeletal:  Good strength in all 4 extremities.   There is good range of motion in both shoulders.    Skin:  No new skin lesions or rashes on the skin excluding the breast (see breast exam above).        Imaging:  10/26/16  [facility]      MAMMO/ SCREENING MAMMO FRANCISCA THRASHER  Heterogeneously dense.  BIRADS category 1. Negative     10/31/2017     [facility]  MAMMO SCREENING  FRANCISCA THRASHER  There is a new group of microcalcifications lower inner left breast 3-4 cm from the nipple.   BIRADS category 0.    11/16/2017 [facility]     MAMMO/DIAG UNT LT DIGITAL FRANCISCA THRASHER  Group of pleomorphic microcalcifications lower inner left breast 6:00 to 7:00 3-4 cm from the nipple.  BIRADS category 4    12/07/17 [facility] BREAST MRI  FRANCISCA THRASHER  Left breast 6’oclock the order of 5cm from the nipple there is a metallic clip within a postbiopsy cavity.  There are some thin linear enhancement seen along the anterior and posterior margins of the cavity.  The cavity and associated thin linear enhancement measured 2.2 cm in anterior to posterior dimension, 1.5 cm in the superior to inferior dimension and 2.9 cm in the mediolateral dimension.  There are no other  areas of abnormal enhancement in the left breast.  No evidence for left axillary adenopathy.  There are no areas of abnormal enhancement or morphology in the right breast.    11/05/18 [facility]  MAMMOGRAM  FRANCISCA THRASHER   IMPRESSIONS: no mammographic evidence of malignancy. Stable exam. Stable post lumpectomy changes on the left. BIRADS 1 negative     [facility] November 11, 2019 bilateral screening mammogram with 3D.  The breast parenchyma is heterogenous Mendel dense.  BI-RADS 2    Pathology:  11/27/17 [facility]     MAMMO/STERIOTACTIC LOCALIZE LT     ANNA MARIE FRANCISCA  9 gauge Eviva.  18 core biopsy.  Tissue marker was deployed.  Post procedure mammography showed the tissue marker to be at the target site.     11/27/17 [facility] PATHOLOGY REPORT FRANCISCA THRASHER  3. Left breast lower inner quadrant at 3-4 cm from nipple: clinically with microcalcifications:  ductal carcinoma in situ, solid and cribriform types, low grade.  Negative for invasive malignancy.    4. Left breast, lower inner quadrant at 3-4 cm from nipple; clinically tissue without microcalcifications:  single focus of ductal carcinoma in situ, solid type with microcalcification identified.    11/27/17 [facility]  HORMONE RECEPTORS  FRANCISCA THRASHER  Estrogen  100   Progesterone   100    01/11/18 [facility]  PATHOLOGY  FRANCISCA THRASHER  Final Diagnosis   LEFT BREAST LUMPECTOMY:                         DUCT CARCINOMA IN SITU, SOLID, LOW GRADE WITH ABSENCE OF NECROSIS.                          FOCALLY, DUCT CARCINOMA IN-SITU IS LESS THAN  0.5 MM FROM LATERAL MARGIN.         The following synoptic report utilizes the June 2017 [facility] protocol for duct carcinoma in situ.     Procedure: Excision.  Specimen laterality: Left.  Size (extent) of DCIS: Estimated extent of DCIS is at least 8 mm.                         Comment: This measurement is based on the presence of DCIS present focally on slides from two blocks, each                         with  estimated thickness of 4 mm.   Histologic type: Duct carcinoma in situ.  Nuclear grade: Grade 1 (low).  Necrosis: Not identified.  Margins: Uninvolved by duct carcinoma in situ.                         Distance from closest margin: Less than 0.5 mm from lateral margin.  Regional lymph nodes: No lymph nodes submitted or found.  Pathologic staging:                         Primary tumor: pTis(DCIS).      CMK/pkm/brb/pkm  IHC/TDJ/THM         Count from [facility] Dr. Paniagua: Currently on hormonal blockade using tamoxifen with good tolerance.  Has a follow-up with her in 3 months.  She started tamoxifen around March 2018        Note from [facility] Doctor Davida Olivarez: Irradiation started February 2, 2018 and completed March 14, 2018.  Left breast was treated in 15 fractions.  No evidence of disease.  Return to see us in 6 months.        Note from [facility] Dr. Paniagua September 13, 2018 continue tamoxifen 20 mg a day.  Follow-up 3 months.    Note dated December 13, 2018 [facility] [place] oncology: Patient to be evaluated by Dr. Angelo gynecology for vaginal bleeding.  Stopped the tamoxifen.  Started patient on Aromasin along with calcium and vitamin D.  Follow-up in 3 months.    Note from Dr. Paniagua dated September 12, 2019.  Patient was started on tamoxifen March 2018, then was switched to Aromasin due to intermittent vaginal bleeding.  Was on Aromasin from November 2018 through June 2019.  At that time due to mood swings and depressive symptoms with switch back to tamoxifen.  Continue tamoxifen 20 mg a day follow-up in 3 months.    Procedures:      Assessment:   Diagnosis Plan   1. Ductal carcinoma in situ (DCIS) of left breast     2. FH: breast cancer     3. History of cervical cancer     4. Ulcerative colitis without complications, unspecified location (CMS/HCC)     1-  Left breast lower inner quadrant, 6:30, 3 cm from the nipple, 6 mm of calcification clustered on her  initial mammogram.  2.2 cm postbiopsy hematoma with enhancement at the perimeter of the cavity.  Clinical stage TisN 0 stage 0.    1-11-18 lumpectomy for DCIS returned as 8 mm of low-grade ductal carcinoma in situ.  Margins are uninvolved.  Distance from closest margin is less than a half millimeter laterally.    Discussed this case with Dr. Ivett Smalls due to the 100% hormone fed nature of the tumor and low grade, she did not feel that reexcision was necessary laterally.    Note from Dr. Davida Olivarez dated March 14, 2018: Radiation therapy started the second 2018 and completed March 14, 2018.  She delivered a dose of 4050 cGy in 15 fractions.     Note from Dr. Paniagua dated March 8, 2018: Start taking tamoxifen 20 mg after completion of radiation therapy.  Vaginal bleeding after tamoxifen, came off the tamoxifen in December 2018 and switched to Aromasin, evaluated by gynecology and felt that her uterine imaging looked benign per patient report.  Due to symptoms of depression on Aromasin, patient returned back to tamoxifen.SInce then, pt has had no additional bleeding.    2-  1 of 2 sisters age 73.  One niece, the daughter of cysts #2 diagnosed in late 20s.  Invitae panel test 1-8-18 negative for mutation.    3-  Cervical cancer in 1990, treated with total abdominal hysterectomy and required no chemotherapy.    4-  Ulcerative colitis diagnosed age 34, treated by Dr. Lucia at [facility].    Plan:    Amber, her  and I reviewed her interval history, imaging reports and exam together today.  There is MARYANNE on her exam or imaging.  She is tolerating the tamoxifen well with no vaginal bleeding at this juncture.    She is seeing Dr. Paniagua every 3 months.  This is her 2-year follow-up and with normal exam and imaging I have not given her a routine follow-up back in our office.  I did give her a reminder that her next routine mammogram would be due at [place] November 12, 2020.  We also discussed routine maintenance  and skin care with moisturization and massage on the left to keep the skin healthy after radiation.  I asked her to continue her self breast exam and to call us in the future with any concerns would be happy to see her back.     Anne Harvey MD      Next Appointment:  Return for any future concerns.    EMR Dragon/transcription disclaimer:    Much of this encounter note is an electronic transcription/translocation of spoken language to printed text.  The electronic translation of spoken language may permit erroneous, or at times, nonsensical words or phrases to be inadvertently transcribed.  Although I have reviewed the note from such areas, some may still exist.

## 2019-12-03 ENCOUNTER — TELEPHONE (OUTPATIENT)
Dept: GASTROENTEROLOGY | Facility: CLINIC | Age: 64
End: 2019-12-03

## 2019-12-03 NOTE — TELEPHONE ENCOUNTER
----- Message from Dora Cross sent at 12/3/2019 10:51 AM EST -----  Regarding: simponi  Contact: [phone]  Per Precious-scheduling at [facility] they do not carry simponi. Pt will need to go someplace else to get this. This is per clinical pharmacy at [facility]. If you have questions please call number above and speak with them. Thanks

## 2019-12-04 NOTE — TELEPHONE ENCOUNTER
Called Verónica in pharmacy and advised that pt is going to have injections done at [facility] .  She suggested we call scheduling and advised them of this.    Called scheduling at [phone] and spoke with Leticia and advised that pt needs to have the injections done and [place] and pt is aware.  Advised her to call with any question. She verb understanding.

## 2019-12-09 ENCOUNTER — OFFICE VISIT (OUTPATIENT)
Dept: GASTROENTEROLOGY | Facility: CLINIC | Age: 64
End: 2019-12-09

## 2019-12-09 VITALS
HEIGHT: 61 IN | BODY MASS INDEX: 23.6 KG/M2 | WEIGHT: 125 LBS | SYSTOLIC BLOOD PRESSURE: 122 MMHG | TEMPERATURE: 98.2 F | DIASTOLIC BLOOD PRESSURE: 80 MMHG

## 2019-12-09 DIAGNOSIS — K51.00 ULCERATIVE PANCOLITIS WITHOUT COMPLICATION (HCC): Primary | ICD-10-CM

## 2019-12-09 DIAGNOSIS — D84.9 IMMUNOCOMPROMISED (HCC): ICD-10-CM

## 2019-12-09 PROCEDURE — 99214 OFFICE O/P EST MOD 30 MIN: CPT | Performed by: INTERNAL MEDICINE

## 2019-12-09 NOTE — PROGRESS NOTES
Chief Complaint   Patient presents with   • Follow-up   • Ulcerative Colitis       History of Present Illness:   63 y.o. female with a history of left-sided ulcerative colitis diagnosed at 34 years of age, and is on Simponi and Apriso one twice daily now.  Patient had breast cancer with lumpectomy in January 2018 and radiation therapy.  She had a colonoscopy in 6 of 2019.  There was intermittent scarring throughout the colon.  The terminal ileum was normal.  In the rectum there is mild colitis.  Pathology was consistent with ulcerative colitis. She thinks that the Simponi (monthly) and the Apriso are working. No diarrhea or rectal bleeding now. Now she averages. No extraintestinal manifestations of UC. Weight stable.     Past Medical History:   Diagnosis Date   • Anemia 2014    recovered   • Anxiety    • Breast cancer (CMS/HCC)     LEFT 2017   • Cervical cancer (CMS/HCC) 1990    LEFT BREAST 2017   • Chronic pancolonic ulcerative colitis (CMS/HCC)     DIAGNOSED 25 YEARS AGO   • History of transfusion    • Lactose intolerance    • PONV (postoperative nausea and vomiting)        Past Surgical History:   Procedure Laterality Date   • BREAST BIOPSY     • BREAST LUMPECTOMY Left 1/11/2018    Procedure: LEFT needle localized lumpectomy;  Surgeon: Anne Harvey MD;  Location: [facility] OR [facility];  Service:    • COLONOSCOPY  03/20/2015    MILD TO MODERATE PANCOLITIS W/SCARRING THROUGHOUT COLON, IH, ACUTE CRYPTITIS, ACTIVE COLITIS   • COLONOSCOPY N/A 4/25/2017    Lots of scarring throughout the colon from her long h/o of ulcerative colitis, mild left sided ulcerative colitis, IH.  PATH: mild chronic active colitis.     • COLONOSCOPY N/A 6/14/2019    ileum was normal, episodic scarring throughout the colon, mild colitis in the rectum, IH   • ENDOSCOPY  03/20/2015    ERYTHEMATOUS MUCOSA IN STOMACH, LEIOMYOMA, REACTIVE MUCOSAL CHANGES, CHRONIC INFLAMMATION   • HYSTERECTOMY     • UPPER GASTROINTESTINAL ENDOSCOPY  03/20/2015     COMPLETED BY DR. MATT FIGUEROA         Current Outpatient Medications:   •  APRISO 0.375 g 24 hr capsule, TAKE TWO CAPSULES BY MOUTH DAILY, Disp: 60 capsule, Rfl: 4  •  calcium carbonate (OS-LIANE) 600 MG tablet, Take 600 mg by mouth Daily., Disp: , Rfl:   •  Cholecalciferol (VITAMIN D3) 5000 units capsule capsule, Take 5,000 Units by mouth Daily., Disp: , Rfl:   •  escitalopram (LEXAPRO) 5 MG tablet, , Disp: , Rfl:   •  fluticasone (FLONASE) 50 MCG/ACT nasal spray, , Disp: , Rfl:   •  SIMPONI 100 MG/ML solution auto-injector, INJECT 100MG UNDER THE SKIN EVERY 4 WEEKS, Disp: 1 syringe, Rfl: 3  •  tamoxifen (NOLVADEX) 20 MG chemo tablet, , Disp: , Rfl:     Allergies   Allergen Reactions   • Contrast Dye Rash       Family History   Problem Relation Age of Onset   • Liver cancer Mother    • Breast cancer Sister 73   • Breast cancer Other 38        neice gene negative    • Cancer Sister         of unknown orgin    • Malig Hyperthermia Neg Hx        Social History     Socioeconomic History   • Marital status:      Spouse name: Not on file   • Number of children: Not on file   • Years of education: Not on file   • Highest education level: Not on file   Tobacco Use   • Smoking status: Never Smoker   • Smokeless tobacco: Never Used   Substance and Sexual Activity   • Alcohol use: Yes     Alcohol/week: 1.0 standard drinks     Types: 1 Glasses of wine per week     Comment: on occ   • Drug use: No   • Sexual activity: Not Currently     Partners: Male     Birth control/protection: Abstinence, Post-menopausal, Other     Comment: Occasionally active with spouse       Review of Systems   All other systems reviewed and are negative.      Vitals:    12/09/19 1029   BP: 122/80   Temp: 98.2 °F (36.8 °C)       Physical Exam   Constitutional: She is oriented to person, place, and time. She appears well-developed and well-nourished. No distress.   HENT:   Head: Normocephalic and atraumatic. Hair is normal.   Right Ear: Hearing,  tympanic membrane, external ear and ear canal normal. No drainage. No decreased hearing is noted.   Left Ear: Hearing, tympanic membrane, external ear and ear canal normal. No decreased hearing is noted.   Nose: No nasal deformity.   Mouth/Throat: Oropharynx is clear and moist.   Eyes: Pupils are equal, round, and reactive to light. Conjunctivae, EOM and lids are normal. Right eye exhibits no discharge. Left eye exhibits no discharge.   Neck: Normal range of motion. Neck supple. No JVD present. No tracheal deviation present. No thyromegaly present.   Cardiovascular: Normal rate, regular rhythm, normal heart sounds, intact distal pulses and normal pulses. Exam reveals no gallop and no friction rub.   No murmur heard.  Pulmonary/Chest: Effort normal and breath sounds normal. No respiratory distress. She has no wheezes. She has no rales. She exhibits no tenderness.   Abdominal: Soft. Bowel sounds are normal. She exhibits no distension and no mass. There is no tenderness. There is no rebound and no guarding. No hernia.   Musculoskeletal: Normal range of motion. She exhibits no edema, tenderness or deformity.   Lymphadenopathy:     She has no cervical adenopathy.   Neurological: She is alert and oriented to person, place, and time. She has normal reflexes. She displays normal reflexes. No cranial nerve deficit. She exhibits normal muscle tone. Coordination normal.   Skin: Skin is warm and dry. No rash noted. She is not diaphoretic. No erythema.   Psychiatric: She has a normal mood and affect. Her behavior is normal. Judgment and thought content normal.   Vitals reviewed.      Amber was seen today for follow-up and ulcerative colitis.    Diagnoses and all orders for this visit:    Ulcerative pancolitis without complication (CMS/[facility])  -     CBC & Differential  -     Comprehensive Metabolic Panel  -     QuantiFERON TB Gold    Immunocompromised (CMS/[facility])      Assessment:  1. UC - on Simponi and Apriso.      Recommendations:  1. Labs: Quantiferon Gold and labs  2. She is changing to MCR tomorrow. Could the Simponi be mailed to the patient at her home for her  to continue to give her monthly Simponi shots instead of her travelling 1.5+ hours to [place] to get this subcutaneous shot monthly.   3. F/u 6 mos.     Return in about 6 months (around 6/9/2020).    Joni Lucia MD  12/9/2019

## 2019-12-10 ENCOUNTER — TELEPHONE (OUTPATIENT)
Dept: GASTROENTEROLOGY | Facility: CLINIC | Age: 64
End: 2019-12-10

## 2019-12-10 LAB
ALBUMIN SERPL-MCNC: 4.4 G/DL (ref 3.6–4.8)
ALBUMIN/GLOB SERPL: 1.6 {RATIO} (ref 1.2–2.2)
ALP SERPL-CCNC: 47 IU/L (ref 39–117)
ALT SERPL-CCNC: 12 IU/L (ref 0–32)
AST SERPL-CCNC: 17 IU/L (ref 0–40)
BASOPHILS # BLD AUTO: 0 X10E3/UL (ref 0–0.2)
BASOPHILS NFR BLD AUTO: 1 %
BILIRUB SERPL-MCNC: 0.2 MG/DL (ref 0–1.2)
BUN SERPL-MCNC: 14 MG/DL (ref 8–27)
BUN/CREAT SERPL: 20 (ref 12–28)
CALCIUM SERPL-MCNC: 9.3 MG/DL (ref 8.7–10.3)
CHLORIDE SERPL-SCNC: 103 MMOL/L (ref 96–106)
CO2 SERPL-SCNC: 24 MMOL/L (ref 20–29)
CREAT SERPL-MCNC: 0.71 MG/DL (ref 0.57–1)
EOSINOPHIL # BLD AUTO: 0.1 X10E3/UL (ref 0–0.4)
EOSINOPHIL NFR BLD AUTO: 2 %
ERYTHROCYTE [DISTWIDTH] IN BLOOD BY AUTOMATED COUNT: 13.8 % (ref 12.3–15.4)
GLOBULIN SER CALC-MCNC: 2.8 G/DL (ref 1.5–4.5)
GLUCOSE SERPL-MCNC: 78 MG/DL (ref 65–99)
HCT VFR BLD AUTO: 42.4 % (ref 34–46.6)
HGB BLD-MCNC: 14.2 G/DL (ref 11.1–15.9)
IMM GRANULOCYTES # BLD AUTO: 0 X10E3/UL (ref 0–0.1)
IMM GRANULOCYTES NFR BLD AUTO: 0 %
LYMPHOCYTES # BLD AUTO: 2.7 X10E3/UL (ref 0.7–3.1)
LYMPHOCYTES NFR BLD AUTO: 42 %
MCH RBC QN AUTO: 31.7 PG (ref 26.6–33)
MCHC RBC AUTO-ENTMCNC: 33.5 G/DL (ref 31.5–35.7)
MCV RBC AUTO: 95 FL (ref 79–97)
MONOCYTES # BLD AUTO: 0.6 X10E3/UL (ref 0.1–0.9)
MONOCYTES NFR BLD AUTO: 10 %
NEUTROPHILS # BLD AUTO: 3 X10E3/UL (ref 1.4–7)
NEUTROPHILS NFR BLD AUTO: 45 %
PLATELET # BLD AUTO: 250 X10E3/UL (ref 150–450)
POTASSIUM SERPL-SCNC: 4.7 MMOL/L (ref 3.5–5.2)
PROT SERPL-MCNC: 7.2 G/DL (ref 6–8.5)
RBC # BLD AUTO: 4.48 X10E6/UL (ref 3.77–5.28)
SODIUM SERPL-SCNC: 142 MMOL/L (ref 134–144)
WBC # BLD AUTO: 6.4 X10E3/UL (ref 3.4–10.8)

## 2019-12-10 NOTE — TELEPHONE ENCOUNTER
Called pt and spoke with pt's  and advised the our pharmacy is unable to mail them the simponi .  Advised he can call his local area hospitals and see if they can give the injections there and if so to let us know and we can send them the order. Pt's  verb understanding.

## 2019-12-11 LAB
GAMMA INTERFERON BACKGROUND BLD IA-ACNC: 0.11 IU/ML
M TB IFN-G BLD-IMP: NEGATIVE
M TB IFN-G CD4+ BCKGRND COR BLD-ACNC: 0.07 IU/ML
MITOGEN IGNF BLD-ACNC: >10 IU/ML
QUANTIFERON INCUBATION: NORMAL
QUANTIFERON TB2 AG VALUE: 0.06 IU/ML
SERVICE CMNT-IMP: NORMAL

## 2019-12-13 ENCOUNTER — TELEPHONE (OUTPATIENT)
Dept: GASTROENTEROLOGY | Facility: CLINIC | Age: 64
End: 2019-12-13

## 2019-12-13 NOTE — TELEPHONE ENCOUNTER
Received vm from ROME at Dr Robbins's office about simponi injections.     Returned call to [phone] and left vm for ROME to call back.

## 2019-12-15 NOTE — PROGRESS NOTES
12/15/19  Tell her that her labs look normal. Her TB test is negative, which is good. FAX to PCP.   Shanae. darin

## 2019-12-17 ENCOUNTER — TELEPHONE (OUTPATIENT)
Dept: GASTROENTEROLOGY | Facility: CLINIC | Age: 64
End: 2019-12-17

## 2019-12-17 NOTE — TELEPHONE ENCOUNTER
----- Message from Joni Lucia MD sent at 12/15/2019  6:33 PM EST -----  12/15/19  Tell her that her labs look normal. Her TB test is negative, which is good. FAX to PCP.   Raz webster    **call to spouse, Jorge (see hipaa).  Advise as above.  Verb understanding.  Labs faxed via epic to Dr Dora Ross.  Cassidy Benitez RN.

## 2019-12-17 NOTE — TELEPHONE ENCOUNTER
Called pt's  and advised that Dr Robbins's office would most likely not work for his wife getting injections due they would need to order the simponi and buy and bill there.  He stated they would not want to do that.  Advised he needs to find a facility that has an acu unit who is willing to give the simponi. Pt's  verb understanding.

## 2019-12-18 ENCOUNTER — TELEPHONE (OUTPATIENT)
Dept: GASTROENTEROLOGY | Facility: CLINIC | Age: 64
End: 2019-12-18

## 2019-12-18 NOTE — TELEPHONE ENCOUNTER
Vishal Nelson,     I believe we found our Sheldon miracle. I talked to a Clinic in [place], [place] that does this service. I believe they are independent of but part of the Hospital there and are set up to do these injections for various illnesses. They said they do Simponi 100.    They are called:   [facility]  [address]  [address] [place]  [address]    [phone]     They said all you have to do is Fax the order to [phone] and they will order the Simponi 100. I'm sure you will have to mention it is to be given every 4 weeks and of course our information.    I sure hope this works out. They are only 20 miles from home.    Tristian Eldridge and Amber Sesay      Above email from pt's  noted.  Order for simponi placed on Dr Lucia's desk for signature

## 2019-12-18 NOTE — TELEPHONE ENCOUNTER
Order signed and faxed to [facility] and fax confirmation received.  Order scanned under media tab.  Called pt's  and advised of the above. He verb understanding.

## 2019-12-20 ENCOUNTER — PRIOR AUTHORIZATION (OUTPATIENT)
Dept: GASTROENTEROLOGY | Facility: CLINIC | Age: 64
End: 2019-12-20

## 2019-12-20 NOTE — TELEPHONE ENCOUNTER
PA for mesalamine suppositories 1000mg received. Per patient's  on HIPAA, she is no longer on this medication so PA is not needed.

## 2020-01-09 ENCOUNTER — TELEPHONE (OUTPATIENT)
Dept: GASTROENTEROLOGY | Facility: CLINIC | Age: 65
End: 2020-01-09

## 2020-01-09 NOTE — TELEPHONE ENCOUNTER
Call to [facility] and spoke with Yesica. Advise that pt will be having simponi injections completed at [facility].  Orders have been sent there.  Verb understanding.

## 2020-01-10 ENCOUNTER — APPOINTMENT (OUTPATIENT)
Dept: ONCOLOGY | Facility: HOSPITAL | Age: 65
End: 2020-01-10

## 2020-01-16 RX ORDER — MESALAMINE 375 MG/1
CAPSULE, EXTENDED RELEASE ORAL
Qty: 60 CAPSULE | Refills: 5 | Status: SHIPPED | OUTPATIENT
Start: 2020-01-16 | End: 2020-07-15

## 2020-01-22 ENCOUNTER — TELEPHONE (OUTPATIENT)
Dept: GASTROENTEROLOGY | Facility: CLINIC | Age: 65
End: 2020-01-22

## 2020-01-22 NOTE — TELEPHONE ENCOUNTER
Vishal Nelson,     I'm sorry to have to bother you with this email, but it appears that Amber's prescription just magically went from a tier 3 to a tier 4 according to Bairon De La Garza, our medicare part D provider. Thus, the price went from $35 to $110.48 per month. I see this prescription is now a generic for Apriso but it only helped by $15.00 per month.    Have you ran across this yet? Is there any other option? This is so not right and difficult in so many ways.    Thank you for checking on this for us.      Tristian and Amber Sesay [phone]        --Called pt and left  for pt to call back.

## 2020-01-23 NOTE — TELEPHONE ENCOUNTER
Called pt's  and he reports that his wife's apriso went up to $110 per month due to going up to a tier 4 drug.   He is asking if there is an alternative that she can take.   Advised will send a message to Dr Lucia and also suggested he call his insurance and see medications similar to apriso are preferred. He verb understanding.

## 2020-01-27 NOTE — TELEPHONE ENCOUNTER
Please call the patient.  We could try the patient on generic mesalamine 1.2 g, 1 p.o. twice daily if that is more affordable?  Lialda 1 p.o. twice daily is another option.  If that is not an option then they can find out from their insurance company which other medicines the Insurance company will pay for to help treat ulcerative colitis? Thx. kjh

## 2020-01-28 ENCOUNTER — PRIOR AUTHORIZATION (OUTPATIENT)
Dept: GASTROENTEROLOGY | Facility: CLINIC | Age: 65
End: 2020-01-28

## 2020-01-28 ENCOUNTER — TELEPHONE (OUTPATIENT)
Dept: GASTROENTEROLOGY | Facility: CLINIC | Age: 65
End: 2020-01-28

## 2020-01-28 NOTE — TELEPHONE ENCOUNTER
----- Message from Keshia Hoang sent at 1/28/2020  3:34 PM EST -----  Regarding: [employer]  CALLING BACK

## 2020-01-28 NOTE — TELEPHONE ENCOUNTER
Call to spouse, Jorge.  Advise per Dr Lucia that could try on generic mesalamine 1.2 g, 1 po twice daily if that is more affordable.  If that not an option, then check with insurance which other meds they will pay for to tx UC.    Advise Jorge that PA in process of mesalamine (see note of 1/28).  Verb understanding.  States will await PA results.

## 2020-04-23 NOTE — TELEPHONE ENCOUNTER
[facility] November 11, 2019 bilateral screening mammogram with 3D.  The breast parenchyma is heterogenous Mendel dense.  BI-RADS 2  
Cephalic

## 2020-07-06 ENCOUNTER — OFFICE VISIT (OUTPATIENT)
Dept: GASTROENTEROLOGY | Facility: CLINIC | Age: 65
End: 2020-07-06

## 2020-07-06 VITALS
TEMPERATURE: 98 F | SYSTOLIC BLOOD PRESSURE: 110 MMHG | BODY MASS INDEX: 22.88 KG/M2 | DIASTOLIC BLOOD PRESSURE: 80 MMHG | WEIGHT: 121.2 LBS | HEIGHT: 61 IN

## 2020-07-06 DIAGNOSIS — K51.00 ULCERATIVE PANCOLITIS WITHOUT COMPLICATION (HCC): Primary | ICD-10-CM

## 2020-07-06 DIAGNOSIS — D84.9 IMMUNOCOMPROMISED (HCC): ICD-10-CM

## 2020-07-06 PROCEDURE — 99213 OFFICE O/P EST LOW 20 MIN: CPT | Performed by: INTERNAL MEDICINE

## 2020-07-06 RX ORDER — MESALAMINE 1000 MG/1
1000 SUPPOSITORY RECTAL NIGHTLY
COMMUNITY
End: 2021-03-01 | Stop reason: SDUPTHER

## 2020-07-06 NOTE — PROGRESS NOTES
Chief Complaint   Patient presents with   • Follow-up   • Ulcerative Colitis       History of Present Illness:   64 y.o. female with a history of left-sided ulcerative colitis diagnosed at 34 years of age, and is on Simponi and Mesalamin ER .375 mg one twice daily now.  Patient had breast cancer with lumpectomy in January 2018 and radiation therapy.  She had a colonoscopy in 6 of 2019.  There was intermittent scarring throughout the colon.  The terminal ileum was normal.  In the rectum there is mild colitis.  Pathology was consistent with ulcerative colitis. She thinks that the Simponi (monthly) and the Mesalamine ER are working. She missed about 1.5 mos of Simponi beccause of problems getting the meds. She took Canasa suppositories when off of Simponi and she did well. Last Simponi dose was 6/22/20. No rectal bleedding or diarhrea. NO abdominal or chest pain. NO nausea or vomiting. No fevers, chills, night sweats. She has lost a few pounds but she is exercising more.     Past Medical History:   Diagnosis Date   • Anemia 2014    recovered   • Anxiety    • Breast cancer (CMS/HCC)     LEFT 2017   • Cervical cancer (CMS/HCC) 1990    LEFT BREAST 2017   • Chronic pancolonic ulcerative colitis (CMS/HCC)     DIAGNOSED 25 YEARS AGO   • History of transfusion    • Lactose intolerance    • PONV (postoperative nausea and vomiting)        Past Surgical History:   Procedure Laterality Date   • BREAST BIOPSY     • BREAST LUMPECTOMY Left 1/11/2018    Procedure: LEFT needle localized lumpectomy;  Surgeon: Anne Harvey MD;  [facility];  Service:    • COLONOSCOPY  03/20/2015    MILD TO MODERATE PANCOLITIS W/SCARRING THROUGHOUT COLON, IH, ACUTE CRYPTITIS, ACTIVE COLITIS   • COLONOSCOPY N/A 4/25/2017    Lots of scarring throughout the colon from her long h/o of ulcerative colitis, mild left sided ulcerative colitis, IH.  PATH: mild chronic active colitis.     • COLONOSCOPY N/A 6/14/2019    ileum was normal, episodic  scarring throughout the colon, mild colitis in the rectum, IH   • ENDOSCOPY  03/20/2015    ERYTHEMATOUS MUCOSA IN STOMACH, LEIOMYOMA, REACTIVE MUCOSAL CHANGES, CHRONIC INFLAMMATION   • HYSTERECTOMY     • UPPER GASTROINTESTINAL ENDOSCOPY  03/20/2015    COMPLETED BY DR. MATT FIGUEROA         Current Outpatient Medications:   •  APRISO 0.375 g 24 hr capsule, TAKE TWO CAPSULES BY MOUTH DAILY, Disp: 60 capsule, Rfl: 5  •  calcium carbonate (OS-LIANE) 600 MG tablet, Take 600 mg by mouth Daily., Disp: , Rfl:   •  Cholecalciferol (VITAMIN D3) 5000 units capsule capsule, Take 5,000 Units by mouth Daily., Disp: , Rfl:   •  escitalopram (LEXAPRO) 5 MG tablet, , Disp: , Rfl:   •  fluticasone (FLONASE) 50 MCG/ACT nasal spray, As Needed., Disp: , Rfl:   •  mesalamine (CANASA) 1000 MG suppository, Insert 1,000 mg into the rectum Every Night., Disp: , Rfl:   •  SIMPONI 100 MG/ML solution auto-injector, INJECT 100MG UNDER THE SKIN EVERY 4 WEEKS, Disp: 1 syringe, Rfl: 3  •  tamoxifen (NOLVADEX) 20 MG chemo tablet, , Disp: , Rfl:     Allergies   Allergen Reactions   • Contrast Dye Rash       Family History   Problem Relation Age of Onset   • Liver cancer Mother    • Breast cancer Sister 73   • Breast cancer Other 38        neice gene negative    • Cancer Sister         of unknown orgin    • Malig Hyperthermia Neg Hx        Social History     Socioeconomic History   • Marital status:      Spouse name: Not on file   • Number of children: Not on file   • Years of education: Not on file   • Highest education level: Not on file   Tobacco Use   • Smoking status: Never Smoker   • Smokeless tobacco: Never Used   Substance and Sexual Activity   • Alcohol use: Yes     Alcohol/week: 1.0 standard drinks     Types: 1 Glasses of wine per week     Comment: on occ   • Drug use: No   • Sexual activity: Not Currently     Partners: Male     Birth control/protection: Abstinence, Post-menopausal, Other     Comment: Occasionally active with spouse        Review of Systems   Gastrointestinal: Negative for abdominal pain.     Pertinent positives and negatives documented in the HPI and all other systems reviewed and were found to be negative.  Vitals:    07/06/20 1046   BP: 110/80   Temp: 98 °F (36.7 °C)       Physical Exam   Constitutional: She is oriented to person, place, and time. She appears well-developed and well-nourished. No distress.   HENT:   Head: Normocephalic and atraumatic. Hair is normal.   Right Ear: Hearing, tympanic membrane, external ear and ear canal normal. No drainage. No decreased hearing is noted.   Left Ear: Hearing, tympanic membrane, external ear and ear canal normal. No decreased hearing is noted.   Nose: No nasal deformity.   Mouth/Throat: Oropharynx is clear and moist.   Eyes: Pupils are equal, round, and reactive to light. Conjunctivae, EOM and lids are normal. Right eye exhibits no discharge. Left eye exhibits no discharge.   Neck: Normal range of motion. Neck supple. No JVD present. No tracheal deviation present. No thyromegaly present.   Cardiovascular: Normal rate, regular rhythm, normal heart sounds, intact distal pulses and normal pulses. Exam reveals no gallop and no friction rub.   No murmur heard.  Pulmonary/Chest: Effort normal and breath sounds normal. No respiratory distress. She has no wheezes. She has no rales. She exhibits no tenderness.   Abdominal: Soft. Bowel sounds are normal. She exhibits no distension and no mass. There is no tenderness. There is no rebound and no guarding. No hernia.   Musculoskeletal: Normal range of motion. She exhibits no edema, tenderness or deformity.   Lymphadenopathy:     She has no cervical adenopathy.   Neurological: She is alert and oriented to person, place, and time. She has normal reflexes. She displays normal reflexes. No cranial nerve deficit. She exhibits normal muscle tone. Coordination normal.   Skin: Skin is warm and dry. No rash noted. She is not diaphoretic. No erythema.    Psychiatric: She has a normal mood and affect. Her behavior is normal. Judgment and thought content normal.   Vitals reviewed.      Amber was seen today for follow-up and ulcerative colitis.    Diagnoses and all orders for this visit:    Ulcerative pancolitis without complication (CMS/HCC)    Immunocompromised (CMS/HCC)      Assessment:  1. UC - on Simponi q 4 weeks and Mesalamine ER. Doing well.  2.     Recommendations:  1. Continue Simponi and Mesalamine ER 2/day  2. F/u 6 mos.     Return in about 6 months (around 1/6/2021).    Joni Lucia MD  7/6/2020

## 2020-07-15 RX ORDER — MESALAMINE 0.38 G/1
CAPSULE, EXTENDED RELEASE ORAL
Qty: 60 CAPSULE | Refills: 5 | Status: SHIPPED | OUTPATIENT
Start: 2020-07-15 | End: 2021-01-13

## 2020-11-10 ENCOUNTER — TELEPHONE (OUTPATIENT)
Dept: GASTROENTEROLOGY | Facility: CLINIC | Age: 65
End: 2020-11-10

## 2020-11-10 NOTE — TELEPHONE ENCOUNTER
----- Message from Amber Sesay sent at 11/10/2020  8:04 AM EST -----  Regarding: Prescription Question  Contact: [phone]  Good Morning,    I talked to [facility] customer care yesterday and they said that they would fill Amber's prescription for Mesalamine 1000 mg suppository with a Prior Authorization. They even quoted me my cost of $245.34 per refill. Since Amber only needs occasionally  we can manage the cost.    They also mentioned that the Prior Authorization for Apriso will  in 2021.    I am going to attach the Form I pulled off their website for the request for coverage determination should your staff need it.    Thank you for your help.    Tristian Sesay on behalf of Amber Sesay ([phone])

## 2020-11-23 ENCOUNTER — TELEPHONE (OUTPATIENT)
Dept: GASTROENTEROLOGY | Facility: CLINIC | Age: 65
End: 2020-11-23

## 2020-11-23 NOTE — TELEPHONE ENCOUNTER
----- Message from Amber Sesay sent at 2020  9:17 AM EST -----  Regarding: Prescription Question  Contact: [phone]  Dr Lucia, approximately 2 weeks ago I sent this same message format to you regarding Amber trying to obtain a PA from [facility] for Mesalamine 1000 mg suppository. When I talked to [facility] customer service they said that's all they need and even gave me the cost of around $250 per month. Since Amber only needs this occasionally we can make that work. Please see attached PA form for your offices use. I hope that we will hear from someone in your office soon. By the way, the Apriso PA will  in February and will need to be renewed. Thank you for your time and help in these request. Carlos Sesay

## 2020-12-04 ENCOUNTER — TELEPHONE (OUTPATIENT)
Dept: GASTROENTEROLOGY | Facility: CLINIC | Age: 65
End: 2020-12-04

## 2020-12-10 NOTE — TELEPHONE ENCOUNTER
TASNEEM arcos Mesalamins (CANASA) suppository approved through 12/07/2021.    Letter scanned to media.

## 2020-12-14 ENCOUNTER — TELEPHONE (OUTPATIENT)
Dept: GASTROENTEROLOGY | Facility: CLINIC | Age: 65
End: 2020-12-14

## 2020-12-14 NOTE — TELEPHONE ENCOUNTER
----- Message from Amber Sesay sent at 12/14/2020 10:13 AM EST -----  Regarding: Prescription Question  Contact: [phone]  Good Morning,    Amber Sesay needs to have her Simponi 100 prescription renewed at the [facility] in [place], [place] where she gets her injection monthly. She had an injection this month and they told Amber that it needed to be renewed for 2021.    Also, please send a prior authorization to [facility] for mesalamine 1000 mg suppository. I sent an attachment with phone numbers previously. If you need the Request Form again please let me know. Their fax number was [phone].    Also, I just requested the June colonoscopy to be scheduled. Waiting on that call back.    Thank you,    Tristian Sesay, spouse  Amber Sesay, patient

## 2020-12-14 NOTE — TELEPHONE ENCOUNTER
See note of 12/4.     Call to spouse, Jorge (see hipaa).  Advise that canasa supp has been approved - good thru 12/7/21.     Verify to send simponi rx to [facility]. Form initiated and to DR Lucia.     Advise that cannot schedule c/s's 6 months out.  Verb understanding.

## 2021-01-09 ENCOUNTER — TELEPHONE (OUTPATIENT)
Dept: GASTROENTEROLOGY | Facility: CLINIC | Age: 66
End: 2021-01-09

## 2021-01-13 RX ORDER — MESALAMINE 0.38 G/1
CAPSULE, EXTENDED RELEASE ORAL
Qty: 60 CAPSULE | Refills: 4 | Status: SHIPPED | OUTPATIENT
Start: 2021-01-13 | End: 2021-06-16

## 2021-02-17 ENCOUNTER — TELEPHONE (OUTPATIENT)
Dept: GASTROENTEROLOGY | Facility: CLINIC | Age: 66
End: 2021-02-17

## 2021-02-17 DIAGNOSIS — K51.00 ULCERATIVE PANCOLITIS WITHOUT COMPLICATION (HCC): Primary | ICD-10-CM

## 2021-02-17 NOTE — TELEPHONE ENCOUNTER
----- Message from Nadja Castillo sent at 2/17/2021 12:48 PM EST -----  Regarding: med refill  Contact: [phone]  Do, from [facility] called for a med refill    SIMPONI 100 MG/ML solution auto-injector 1 syringe 3 4/23/2019    Sig: INJECT 100MG UNDER THE SKIN EVERY 4 WEEKS   Sent to pharmacy as: Simponi 100 MG/ML Subcutaneous Solution Auto-injector   E-Prescribing Status: Receipt confirmed by pharmacy (4/23/2019 10:01 AM EDT)   Pharmacy    [id number] - [place], [place] - [address] [phone] [facility] [phone] FX

## 2021-02-18 NOTE — TELEPHONE ENCOUNTER
SA/MT - I am OK with refilling Simponi but I don't see labs done recently. Please refill for Simponi for one year and I want labs done on her:  - CBC, CMP, and Quantiferon Gold test.       Also, when you talk to the patient make sure she is doing well? Raz kjbenigno

## 2021-02-19 ENCOUNTER — TELEPHONE (OUTPATIENT)
Dept: GASTROENTEROLOGY | Facility: CLINIC | Age: 66
End: 2021-02-19

## 2021-02-19 DIAGNOSIS — K51.00 ULCERATIVE PANCOLITIS WITHOUT COMPLICATION (HCC): Primary | ICD-10-CM

## 2021-02-19 NOTE — TELEPHONE ENCOUNTER
Pt is asking if it is ok for her to have the covid vaccine.   Advised will send message to Dr Lucia.

## 2021-02-19 NOTE — TELEPHONE ENCOUNTER
----- Message from Nadja Castillo sent at 2/19/2021  9:46 AM EST -----  Regarding: PA needed  Contact: [phone]  Medication needs PA    mesalamine (APRISO) 0.375 g 24 hr capsule 60 capsule 4 1/13/2021    Sig: TAKE TWO CAPSULES BY MOUTH DAILY   Sent to pharmacy as: Mesalamine ER 0.375 GM Oral Capsule Extended Release 24 Hour (APRISO)   E-Prescribing Status: Receipt confirmed by pharmacy (1/13/2021 10:51 AM EST)   Pharmacy    [address] [facility] [address] - [phone]  - [phone] FX

## 2021-02-19 NOTE — TELEPHONE ENCOUNTER
Called pt and spoke with pt's  and advised of Dr Lucia's note.  He verb understanding and states that his wife will get her labs done at the local [facility] in [place].     Message sent to Dr Lucia to cosign labs and simponi script.

## 2021-02-20 RX ORDER — GOLIMUMAB 100 MG/ML
100 INJECTION, SOLUTION SUBCUTANEOUS
Qty: 1 EACH | Refills: 6 | Status: SHIPPED | OUTPATIENT
Start: 2021-02-20 | End: 2022-04-27

## 2021-02-23 LAB
ALBUMIN SERPL-MCNC: 4.4 G/DL (ref 3.8–4.8)
ALBUMIN/GLOB SERPL: 1.4 {RATIO} (ref 1.2–2.2)
ALP SERPL-CCNC: 56 IU/L (ref 39–117)
ALT SERPL-CCNC: 9 IU/L (ref 0–32)
AST SERPL-CCNC: 15 IU/L (ref 0–40)
BASOPHILS # BLD AUTO: 0 X10E3/UL (ref 0–0.2)
BASOPHILS NFR BLD AUTO: 1 %
BILIRUB SERPL-MCNC: <0.2 MG/DL (ref 0–1.2)
BUN SERPL-MCNC: 11 MG/DL (ref 8–27)
BUN/CREAT SERPL: 17 (ref 12–28)
CALCIUM SERPL-MCNC: 9.5 MG/DL (ref 8.7–10.3)
CHLORIDE SERPL-SCNC: 104 MMOL/L (ref 96–106)
CO2 SERPL-SCNC: 25 MMOL/L (ref 20–29)
CREAT SERPL-MCNC: 0.63 MG/DL (ref 0.57–1)
EOSINOPHIL # BLD AUTO: 0.1 X10E3/UL (ref 0–0.4)
EOSINOPHIL NFR BLD AUTO: 2 %
ERYTHROCYTE [DISTWIDTH] IN BLOOD BY AUTOMATED COUNT: 12.4 % (ref 11.7–15.4)
GLOBULIN SER CALC-MCNC: 3.1 G/DL (ref 1.5–4.5)
GLUCOSE SERPL-MCNC: 87 MG/DL (ref 65–99)
HCT VFR BLD AUTO: 40.2 % (ref 34–46.6)
HGB BLD-MCNC: 13.8 G/DL (ref 11.1–15.9)
IMM GRANULOCYTES # BLD AUTO: 0 X10E3/UL (ref 0–0.1)
IMM GRANULOCYTES NFR BLD AUTO: 0 %
LYMPHOCYTES # BLD AUTO: 2.4 X10E3/UL (ref 0.7–3.1)
LYMPHOCYTES NFR BLD AUTO: 35 %
MCH RBC QN AUTO: 31.9 PG (ref 26.6–33)
MCHC RBC AUTO-ENTMCNC: 34.3 G/DL (ref 31.5–35.7)
MCV RBC AUTO: 93 FL (ref 79–97)
MONOCYTES # BLD AUTO: 0.8 X10E3/UL (ref 0.1–0.9)
MONOCYTES NFR BLD AUTO: 11 %
NEUTROPHILS # BLD AUTO: 3.7 X10E3/UL (ref 1.4–7)
NEUTROPHILS NFR BLD AUTO: 51 %
PLATELET # BLD AUTO: 265 X10E3/UL (ref 150–450)
POTASSIUM SERPL-SCNC: 4.8 MMOL/L (ref 3.5–5.2)
PROT SERPL-MCNC: 7.5 G/DL (ref 6–8.5)
RBC # BLD AUTO: 4.32 X10E6/UL (ref 3.77–5.28)
SODIUM SERPL-SCNC: 143 MMOL/L (ref 134–144)
WBC # BLD AUTO: 7.1 X10E3/UL (ref 3.4–10.8)

## 2021-02-25 LAB
GAMMA INTERFERON BACKGROUND BLD IA-ACNC: 0.11 IU/ML
M TB IFN-G BLD-IMP: NEGATIVE
M TB IFN-G CD4+ BCKGRND COR BLD-ACNC: 0.1 IU/ML
M TB IFN-G CD4+CD8+ BCKGRND COR BLD-ACNC: 0.13 IU/ML
MITOGEN IGNF BLD-ACNC: >10 IU/ML
QUANTIFERON INCUBATION: NORMAL
SERVICE CMNT-IMP: NORMAL

## 2021-02-26 ENCOUNTER — TELEPHONE (OUTPATIENT)
Dept: GASTROENTEROLOGY | Facility: CLINIC | Age: 66
End: 2021-02-26

## 2021-02-26 NOTE — TELEPHONE ENCOUNTER
----- Message from Amber Sesay sent at 2/26/2021 10:10 AM EST -----  Regarding: Prescription Question  Contact: [phone]  [facility] pharmacy is still waiting on PA for Apriso ER .375    2/26/2021

## 2021-02-28 NOTE — TELEPHONE ENCOUNTER
02/28/21  Tell her that her lab work showed that her TB test was negative, which is good.  The other labs look good.  Please fax a copy of this report to her PCP.  Raz webster

## 2021-03-01 ENCOUNTER — TELEPHONE (OUTPATIENT)
Dept: GASTROENTEROLOGY | Facility: CLINIC | Age: 66
End: 2021-03-01

## 2021-03-01 RX ORDER — MESALAMINE 1000 MG/1
1000 SUPPOSITORY RECTAL NIGHTLY
Qty: 30 SUPPOSITORY | Refills: 11 | Status: SHIPPED | OUTPATIENT
Start: 2021-03-01

## 2021-03-01 NOTE — TELEPHONE ENCOUNTER
----- Message from Nadja Crystal sent at 3/1/2021 11:03 AM EST -----  Regarding: mesalamine (CANASA) 1000 MG suppository  Contact: [phone]  Pt would like prescription called in.    [facility] [address] [facility] [address] - [phone]  - [phone]   Phone:  [phone]  Fax:  [phone]

## 2021-03-31 ENCOUNTER — TELEPHONE (OUTPATIENT)
Dept: GASTROENTEROLOGY | Facility: CLINIC | Age: 66
End: 2021-03-31

## 2021-04-01 NOTE — TELEPHONE ENCOUNTER
Patient should come in and discuss this further with Dr. Lucia.  I am sure he would want to do an exam and discuss things further.

## 2021-04-02 ENCOUNTER — TELEPHONE (OUTPATIENT)
Dept: GASTROENTEROLOGY | Facility: CLINIC | Age: 66
End: 2021-04-02

## 2021-04-02 NOTE — TELEPHONE ENCOUNTER
Pt called and advised of Yi's note. Advised will send message to manager for appt time with Dr Lucia.

## 2021-04-13 ENCOUNTER — TELEPHONE (OUTPATIENT)
Dept: GASTROENTEROLOGY | Facility: CLINIC | Age: 66
End: 2021-04-13

## 2021-04-13 NOTE — TELEPHONE ENCOUNTER
----- Message from Joni Lucia MD sent at 2/28/2021  3:01 PM EST -----  02/28/21  Tell her that her lab work showed that her TB test was negative, which is good.  The other labs look good.  Please fax a copy of this report to her PCP.  Shanae. [email or web address]

## 2021-04-13 NOTE — TELEPHONE ENCOUNTER
Call to spouse, Jorge (see hipaa).  Advise per DR Lucia note.  Verb understanding.     Labs faxed via epic to Dora Ross.

## 2021-04-21 ENCOUNTER — TELEPHONE (OUTPATIENT)
Dept: GASTROENTEROLOGY | Facility: CLINIC | Age: 66
End: 2021-04-21

## 2021-04-21 NOTE — TELEPHONE ENCOUNTER
----- Message from Amber Sesay sent at 4/20/2021  4:53 PM EDT -----  Regarding: Non-Urgent Medical Question  Contact: [phone]  Amber Sesay would like to cancel her appointment for April 28th due to having a colonoscopy on Wednesday, May 12th. Due to blood in her stool and the confusion of not knowing whether it's Colitis or internal hemorrhoids, and also due to the uncertainty of when she could get a colonoscopy at [facility], she has elected to have Dr. Medellin at [facility] do the procedure. We will ask for the results to be sent to Doctor Lucia.  Thank you for scheduling the appointment, but Amber's disease needed more urgent attention and we will see what the Colonoscopy results are before rescheduling an appointment.

## 2021-04-21 NOTE — TELEPHONE ENCOUNTER
Call to spouse, Jorge (see hipaa).  Advise message received.  Will send update to Dr Lucia - currently out of office.  Verb understanding.

## 2021-04-26 NOTE — TELEPHONE ENCOUNTER
Tell her that I did not know that she was having rectal bleeding issues. My recommendation would be that the doctor that is managing her UC should do the colonoscopy. Is Dr. Medellin a GI doctor and could he/she manage her UC? Is the UC medicine she is on not working? We will see how things go? Thx.kjh

## 2021-04-27 ENCOUNTER — TELEPHONE (OUTPATIENT)
Dept: GASTROENTEROLOGY | Facility: CLINIC | Age: 66
End: 2021-04-27

## 2021-04-27 NOTE — TELEPHONE ENCOUNTER
Called pt and spoke with pt's  and advised of Dr Lucia's note.   Verb understanding. He states that his wife is due for a c/s this year and he did not hear anything from us and could not even get and appt.  Per last scope in 2019.  Pt due again in 06/2021 for her next c/s.  He does state that Dr Medellin is a general surgeon and he is doing the c/s there in [facility].   He also reports that his wife has been dong really well the last 4-5 days. He states he will have them send Dr Lucia the c/s results.  ADvised will update Dr Lucia. .

## 2021-04-29 ENCOUNTER — TELEPHONE (OUTPATIENT)
Dept: GASTROENTEROLOGY | Facility: CLINIC | Age: 66
End: 2021-04-29

## 2021-04-29 NOTE — TELEPHONE ENCOUNTER
I called the  because I couldn't reach the patient.        In 2/21 she was having rectal bleeding with some diarrhea. She got the Anucort to treat her internal hemorrhoids and she now has regular BM's with no rectal bleeding. To have a colonoscopy in a couple of weeks. She is on Simponi monthly (on it x several years and her last injection was this week), Apriso 2/day, Canasa suppositories (she restarted this in 2/21). The Anucort helped also.        I told him to have the surgeon in [place], [place] send me a copy of the colonoscopy report and the pathology report.  If the colitis appears reasonably active then I would check a golimumab trough level and antibody level just prior to her next Simponi infusion.  If she has developed antibodies we might consider going to another medicine like Humira.  If the antibody level is negative but the golimumab level is low we could increase the Symphony to every 2 weeks potentially? darin

## 2021-05-27 ENCOUNTER — TELEPHONE (OUTPATIENT)
Dept: GASTROENTEROLOGY | Facility: CLINIC | Age: 66
End: 2021-05-27

## 2021-05-27 NOTE — TELEPHONE ENCOUNTER
----- Message from Amber NAKITAAngel Sesay sent at 5/27/2021  8:56 AM EDT -----  Regarding: Test Results Question  Contact: [phone]  Doctor Lucia requested that we contact him 10 days after Amber's colonoscopy. It has been about 2 weeks and the lab work (Pathology Report and Celiac Disease Antibody Panel) should be at your office for the doctors review. Please ask Doctor Lucia to follow up with a call to Amber as soon as possible to make sure we continue the same medicines.   After about 2 months of very active UC (mid Feb to mid April) Amber is now back in remission for about the last 3 to 4 weeks. She just had a Simponi 100 injection this past Monday, May 24th. Doctor Lucia had indicated he may want to check for antibodies against the Simponi had her condition not turned around. I'm not sure if that is the case now. Thank you  Tristian and Amber Seasy   [phone]

## 2021-05-28 NOTE — TELEPHONE ENCOUNTER
Please get her pathology report from the colonoscopy done on 5/12/2021 in the [facility] in [facility].  Thanks

## 2021-05-28 NOTE — TELEPHONE ENCOUNTER
Call to  [facility] and spoke with Polly.  Request path report of c/s 5/12/21 be faxed to .

## 2021-06-01 ENCOUNTER — TELEPHONE (OUTPATIENT)
Dept: GASTROENTEROLOGY | Facility: CLINIC | Age: 66
End: 2021-06-01

## 2021-06-01 DIAGNOSIS — K51.00 ULCERATIVE PANCOLITIS WITHOUT COMPLICATION (HCC): Primary | ICD-10-CM

## 2021-06-01 NOTE — TELEPHONE ENCOUNTER
Call to spouse, Jorge. Advise per DR Lucia note.  Verb understanding.      Next Simponi due 6/21 (on a Monday).  Pt will obtain labs at [facility] in [place] on Friday or Saturday prior.      Lab orders placed.  Message to DR Lucia.      F/u appt scheduled with DR Lucia for 7/20 @ 12pm.     Path report faxed to DR Dora Ross @ [phone] - confirmation received.

## 2021-06-01 NOTE — TELEPHONE ENCOUNTER
----- Message from Joni Lucia MD sent at 5/31/2021  9:19 PM EDT -----  05/31/21       Please tell the patient's  that I finally got the path report from the colonoscopy. I would recommend that we get the following labs to be drawn the day before her next dose of Simponi:  - trough Golimumab(Simponi) level and antibody level, CBC, CMP, Sed rate, and Creactive protein.        I would also like to see his wife (the patient) in the office 2-3 weeks after the labs are done. Please see if you can schedule all of the above?       FAX to her PCP.  Shanae. kj

## 2021-06-16 RX ORDER — MESALAMINE 0.38 G/1
CAPSULE, EXTENDED RELEASE ORAL
Qty: 60 CAPSULE | Refills: 11 | Status: SHIPPED | OUTPATIENT
Start: 2021-06-16 | End: 2021-08-18 | Stop reason: DRUGHIGH

## 2021-06-19 LAB
ALBUMIN SERPL-MCNC: 4.3 G/DL (ref 3.8–4.8)
ALBUMIN/GLOB SERPL: 1.3 {RATIO} (ref 1.2–2.2)
ALP SERPL-CCNC: 55 IU/L (ref 48–121)
ALT SERPL-CCNC: 12 IU/L (ref 0–32)
AST SERPL-CCNC: 19 IU/L (ref 0–40)
BASOPHILS # BLD AUTO: 0.1 X10E3/UL (ref 0–0.2)
BASOPHILS NFR BLD AUTO: 1 %
BILIRUB SERPL-MCNC: 0.3 MG/DL (ref 0–1.2)
BUN SERPL-MCNC: 12 MG/DL (ref 8–27)
BUN/CREAT SERPL: 18 (ref 12–28)
CALCIUM SERPL-MCNC: 9.3 MG/DL (ref 8.7–10.3)
CHLORIDE SERPL-SCNC: 104 MMOL/L (ref 96–106)
CO2 SERPL-SCNC: 24 MMOL/L (ref 20–29)
CREAT SERPL-MCNC: 0.65 MG/DL (ref 0.57–1)
CRP SERPL-MCNC: 3 MG/L (ref 0–10)
EOSINOPHIL # BLD AUTO: 0.1 X10E3/UL (ref 0–0.4)
EOSINOPHIL NFR BLD AUTO: 1 %
ERYTHROCYTE [DISTWIDTH] IN BLOOD BY AUTOMATED COUNT: 12.7 % (ref 11.7–15.4)
ERYTHROCYTE [SEDIMENTATION RATE] IN BLOOD BY WESTERGREN METHOD: 20 MM/HR (ref 0–40)
GLOBULIN SER CALC-MCNC: 3.3 G/DL (ref 1.5–4.5)
GLUCOSE SERPL-MCNC: 80 MG/DL (ref 65–99)
HCT VFR BLD AUTO: 41.1 % (ref 34–46.6)
HGB BLD-MCNC: 13.9 G/DL (ref 11.1–15.9)
IMM GRANULOCYTES # BLD AUTO: 0.1 X10E3/UL (ref 0–0.1)
IMM GRANULOCYTES NFR BLD AUTO: 1 %
LYMPHOCYTES # BLD AUTO: 2.8 X10E3/UL (ref 0.7–3.1)
LYMPHOCYTES NFR BLD AUTO: 31 %
MCH RBC QN AUTO: 30.9 PG (ref 26.6–33)
MCHC RBC AUTO-ENTMCNC: 33.8 G/DL (ref 31.5–35.7)
MCV RBC AUTO: 91 FL (ref 79–97)
MONOCYTES # BLD AUTO: 0.7 X10E3/UL (ref 0.1–0.9)
MONOCYTES NFR BLD AUTO: 8 %
NEUTROPHILS # BLD AUTO: 5.3 X10E3/UL (ref 1.4–7)
NEUTROPHILS NFR BLD AUTO: 58 %
PLATELET # BLD AUTO: 284 X10E3/UL (ref 150–450)
POTASSIUM SERPL-SCNC: 4.7 MMOL/L (ref 3.5–5.2)
PROT SERPL-MCNC: 7.6 G/DL (ref 6–8.5)
RBC # BLD AUTO: 4.5 X10E6/UL (ref 3.77–5.28)
SODIUM SERPL-SCNC: 143 MMOL/L (ref 134–144)
WBC # BLD AUTO: 9 X10E3/UL (ref 3.4–10.8)

## 2021-06-23 ENCOUNTER — TELEPHONE (OUTPATIENT)
Dept: GASTROENTEROLOGY | Facility: CLINIC | Age: 66
End: 2021-06-23

## 2021-06-23 NOTE — TELEPHONE ENCOUNTER
Called pt and left  for pt to call back.     Dr Lucia's note sent to Dr Ross' thru [facility].

## 2021-06-23 NOTE — TELEPHONE ENCOUNTER
06/23/21       Tell her that her lab work looks normal.  I do not have the results of the golimumab trough level and antibody level yet.  Please fax a copy of this report to her PCP.  Raz webster

## 2021-07-01 LAB
GOLIMUMAB AB SERPL IA-MCNC: <20 NG/ML
GOLIMUMAB SERPL IA-MCNC: 0.7 UG/ML

## 2021-07-02 ENCOUNTER — TELEPHONE (OUTPATIENT)
Dept: GASTROENTEROLOGY | Facility: CLINIC | Age: 66
End: 2021-07-02

## 2021-07-02 NOTE — TELEPHONE ENCOUNTER
----- Message from Amber Sesay sent at 7/2/2021 10:57 AM EDT -----  Regarding: Test Results Question  Contact: [phone]  We see on MyChart that the results of the blood test showed .7 golimumab. This is greater than the .5 that was given as a starting point. Since this  blood test was taken three days before the next Simponi shot, do you think this is working sufficiently?

## 2021-07-06 ENCOUNTER — TELEPHONE (OUTPATIENT)
Dept: GASTROENTEROLOGY | Facility: CLINIC | Age: 66
End: 2021-07-06

## 2021-07-06 NOTE — TELEPHONE ENCOUNTER
----- Message from Joni Lucia MD sent at 7/5/2021  3:29 PM EDT -----  07/05/21       I called her but could not reach her to discuss labs. Tell her that her Golimumab (Simponi) antibody level is low (<20), which is good. Her trough Golimumab level is 0.7 which is low. I would like to have a trough Golimumab level of >4.3 if possible. I would recommend that we increase the Simponi from 100 mg SQ every 4 weeks to 100 mg SQ every 2 weeks to see if it will work better? If she is OK with that then please see if her insurance company will agree to that since the Simponi doesn't seem to be working very well. I see that she is scheduled to see me in the office 7/26/21.       Her other labs look good. FAX to her PCP.   Thx. kjh

## 2021-07-06 NOTE — TELEPHONE ENCOUNTER
Call to spouse, Jorge (see hipaa).  Advise per DR Lucia note.  Verb understanding.     Agreeable to simponi increase.  Advise that MA will f/u on preauth.   Message to Manager.     Lab faxed via epic to DR Dora Ross.

## 2021-07-12 ENCOUNTER — TELEPHONE (OUTPATIENT)
Dept: GASTROENTEROLOGY | Facility: CLINIC | Age: 66
End: 2021-07-12

## 2021-07-12 NOTE — TELEPHONE ENCOUNTER
----- Message from Amber Sesay sent at 7/12/2021 10:40 AM EDT -----  Regarding: Test Results Question  Contact: [phone]  Good Morning Leslie/Cassidy or Dr Lucia,    Amber and I were visiting Family in [place] last Tuesday when you called. I'm sorry we missed your call. Please feel free to call my cell ([phone]) if there is no answer at her home number.     We hope you have information regarding two shots of Simponi 100 per month and if it's covered by our insurance. Amber's symptoms started up again already about 2 weeks ago with diarrhea and bleeding. She is taking the mesalamine 1000 suppository and hoping it gets things back in control in a few more days.    We look forward to hearing from you.    Thank you,    Tristian and Amber Sesay

## 2021-07-16 ENCOUNTER — PRIOR AUTHORIZATION (OUTPATIENT)
Dept: GASTROENTEROLOGY | Facility: CLINIC | Age: 66
End: 2021-07-16

## 2021-07-16 NOTE — TELEPHONE ENCOUNTER
Simponi (increase) [employer] [facility] form completed and on Dr. Lucia's desk waiting for signature.

## 2021-07-26 ENCOUNTER — TELEPHONE (OUTPATIENT)
Dept: GASTROENTEROLOGY | Facility: CLINIC | Age: 66
End: 2021-07-26

## 2021-07-26 ENCOUNTER — OFFICE VISIT (OUTPATIENT)
Dept: GASTROENTEROLOGY | Facility: CLINIC | Age: 66
End: 2021-07-26

## 2021-07-26 VITALS — HEIGHT: 61 IN | WEIGHT: 122.2 LBS | BODY MASS INDEX: 23.07 KG/M2

## 2021-07-26 DIAGNOSIS — K51.011 ULCERATIVE PANCOLITIS WITH RECTAL BLEEDING (HCC): Primary | ICD-10-CM

## 2021-07-26 PROCEDURE — 99214 OFFICE O/P EST MOD 30 MIN: CPT | Performed by: INTERNAL MEDICINE

## 2021-07-26 NOTE — TELEPHONE ENCOUNTER
----- Message from Amber Sesay sent at 7/23/2021  3:39 PM EDT -----  Regarding: Prescription Question  Contact: [phone]  Please see attached first page of denial from [employer] for the (I'm assuming) additional dosage of the Simponi 100.    The authorized  prescription appears to be Humira.

## 2021-07-26 NOTE — PROGRESS NOTES
"Chief Complaint   Patient presents with   • Ulcerative Colitis       History of Present Illness:   65 y.o. female from [place], [place] with a history of left-sided ulcerative colitis diagnosed at 34 years of age, and is on Simponi (she was supposed to start taking Simponi eveery 2 weeks but her insurance company ([employer]) says that Simponi is nnot on their formulary and they won't pay for Simponi anymore. They prefer Humira) and Mesalamin ER .375 mg one twice daily.  She was last seen by me in 7 of 2020.  She last had a colonoscopy 5/12/21 by a Surgeon in [place], [place] (@ [facility]) that showed \"colitis\". Patient had breast cancer with lumpectomy in January 2018 and radiation therapy.       It sounds like in the last few months the Simponi has not been working as well with intermittent diarrhea and rectal bleeding.  I put a note in her chart dated 7/5/2021 that said:  \"Tell her that her Golimumab (Simponi) antibody level is low (<20), which is good. Her trough Golimumab level is 0.7 which is low. I would like to have a trough Golimumab level of >4.3 if possible. I would recommend that we increase the Simponi from 100 mg SQ every 4 weeks to 100 mg SQ every 2 weeks to see if it will work better? If she is OK with that then please see if her insurance company will agree to that since the Simponi doesn't seem to be working very well.\"        She is still having bleeding and diarrhea. She had 3 Bm yesterday. No abdominal or chest pain. She is also on mesalamine suppositories q night. She is on Apriso 2/day.     Past Medical History:   Diagnosis Date   • Anemia 2014    recovered   • Anxiety    • Breast cancer (CMS/HCC)     LEFT 2017   • Cervical cancer (CMS/HCC) 1990    LEFT BREAST 2017   • Chronic pancolonic ulcerative colitis (CMS/HCC)     DIAGNOSED 25 YEARS AGO   • History of transfusion    • Lactose intolerance    • PONV (postoperative nausea and vomiting)        Past Surgical History:   Procedure Laterality Date "   • BREAST BIOPSY     • BREAST LUMPECTOMY Left 1/11/2018    Procedure: LEFT needle localized lumpectomy;  Surgeon: Anne Harvey MD;  [place] [facility] [place] [facility]  Service:    • COLONOSCOPY  03/20/2015    MILD TO MODERATE PANCOLITIS W/SCARRING THROUGHOUT COLON, IH, ACUTE CRYPTITIS, ACTIVE COLITIS   • COLONOSCOPY N/A 4/25/2017    Lots of scarring throughout the colon from her long h/o of ulcerative colitis, mild left sided ulcerative colitis, IH.  PATH: mild chronic active colitis.     • COLONOSCOPY N/A 6/14/2019    ileum was normal, episodic scarring throughout the colon, mild colitis in the rectum, IH   • COLONOSCOPY  05/12/2021    Crohns vs ulcerative colitis- Sunday Medellin M.D.   • ENDOSCOPY  03/20/2015    ERYTHEMATOUS MUCOSA IN STOMACH, LEIOMYOMA, REACTIVE MUCOSAL CHANGES, CHRONIC INFLAMMATION   • HYSTERECTOMY     • UPPER GASTROINTESTINAL ENDOSCOPY  03/20/2015    COMPLETED BY DR. MATT FIGUEROA         Current Outpatient Medications:   •  calcium carbonate (OS-LIANE) 600 MG tablet, Take 600 mg by mouth Daily., Disp: , Rfl:   •  Cholecalciferol (VITAMIN D3) 5000 units capsule capsule, Take 5,000 Units by mouth Daily., Disp: , Rfl:   •  escitalopram (LEXAPRO) 5 MG tablet, , Disp: , Rfl:   •  fluticasone (FLONASE) 50 MCG/ACT nasal spray, As Needed., Disp: , Rfl:   •  Golimumab (Simponi) 100 MG/ML solution auto-injector, Inject 100 mg under the skin into the appropriate area as directed Every 28 (Twenty-Eight) Days., Disp: 1 each, Rfl: 6  •  mesalamine (APRISO) 0.375 g 24 hr capsule, TAKE TWO CAPSULES BY MOUTH DAILY, Disp: 60 capsule, Rfl: 11  •  mesalamine (CANASA) 1000 MG suppository, Insert 1 suppository into the rectum Every Night., Disp: 30 suppository, Rfl: 11  •  tamoxifen (NOLVADEX) 20 MG chemo tablet, , Disp: , Rfl:     No Known Allergies    Family History   Problem Relation Age of Onset   • Liver cancer Mother    • Breast cancer Sister 73   • Breast cancer Other 38        neice gene negative    • Cancer Sister          of unknown orgin    • Malig Hyperthermia Neg Hx        Social History     Socioeconomic History   • Marital status:      Spouse name: Not on file   • Number of children: Not on file   • Years of education: Not on file   • Highest education level: Not on file   Tobacco Use   • Smoking status: Never Smoker   • Smokeless tobacco: Never Used   Substance and Sexual Activity   • Alcohol use: Yes     Alcohol/week: 1.0 standard drinks     Types: 1 Glasses of wine per week     Comment: on occ   • Drug use: No   • Sexual activity: Not Currently     Partners: Male     Birth control/protection: Abstinence, Post-menopausal, Other     Comment: Occasionally active with spouse       Review of Systems   Gastrointestinal: Negative for abdominal pain.   All other systems reviewed and are negative.    Pertinent positives and negatives documented in the HPI and all other systems reviewed and were found to be negative.  There were no vitals filed for this visit.    Physical Exam  Vitals reviewed.   Constitutional:       General: She is not in acute distress.     Appearance: Normal appearance. She is well-developed. She is not diaphoretic.   HENT:      Head: Normocephalic and atraumatic. Hair is normal.      Right Ear: Hearing, tympanic membrane, ear canal and external ear normal. No decreased hearing noted. No drainage.      Left Ear: Hearing, tympanic membrane, ear canal and external ear normal. No decreased hearing noted.      Nose: Nose normal. No nasal deformity.      Mouth/Throat:      Mouth: Mucous membranes are moist.   Eyes:      General: Lids are normal.         Right eye: No discharge.         Left eye: No discharge.      Extraocular Movements: Extraocular movements intact.      Conjunctiva/sclera: Conjunctivae normal.      Pupils: Pupils are equal, round, and reactive to light.   Neck:      Thyroid: No thyromegaly.      Vascular: No JVD.      Trachea: No tracheal deviation.   Cardiovascular:      Rate and Rhythm:  Normal rate and regular rhythm.      Pulses: Normal pulses.      Heart sounds: Normal heart sounds. No murmur heard.   No friction rub. No gallop.    Pulmonary:      Effort: Pulmonary effort is normal. No respiratory distress.      Breath sounds: Normal breath sounds. No wheezing or rales.   Chest:      Chest wall: No tenderness.   Abdominal:      General: Bowel sounds are normal. There is no distension.      Palpations: Abdomen is soft. There is no mass.      Tenderness: There is no abdominal tenderness. There is no guarding or rebound.      Hernia: No hernia is present.   Musculoskeletal:         General: No tenderness or deformity. Normal range of motion.      Cervical back: Normal range of motion and neck supple.   Lymphadenopathy:      Cervical: No cervical adenopathy.   Skin:     General: Skin is warm and dry.      Findings: No erythema or rash.   Neurological:      Mental Status: She is alert and oriented to person, place, and time.      Cranial Nerves: No cranial nerve deficit.      Motor: No abnormal muscle tone.      Coordination: Coordination normal.      Deep Tendon Reflexes: Reflexes are normal and symmetric. Reflexes normal.   Psychiatric:         Mood and Affect: Mood normal.         Behavior: Behavior normal.         Thought Content: Thought content normal.         Judgment: Judgment normal.         Diagnoses and all orders for this visit:    1. Ulcerative pancolitis with rectal bleeding (CMS/HCC) (Primary)      Assessment:  1. UC - on Simponi q 2 weeks (now) and Mesalamine ER. Her insurance has refused Simponi.   2.     Recommendations:  1. Increase the Mesalamine to 4/day. Continue the mesalamine suppositories q HS.   2. Lets start her on Humira every 2 weeks. (stop Simponi)  3. F/u 3 mos.     Return in about 3 months (around 10/26/2021).    Joni Lucia MD  7/26/2021

## 2021-07-26 NOTE — TELEPHONE ENCOUNTER
Message sent to Cristy WHITE to work on humira pa.  Pt failed simponi for her uc and Dr Lucia is wanting pt on Gideon.

## 2021-08-02 ENCOUNTER — PRIOR AUTHORIZATION (OUTPATIENT)
Dept: GASTROENTEROLOGY | Facility: CLINIC | Age: 66
End: 2021-08-02

## 2021-08-04 ENCOUNTER — TELEPHONE (OUTPATIENT)
Dept: GASTROENTEROLOGY | Facility: CLINIC | Age: 66
End: 2021-08-04

## 2021-08-04 NOTE — TELEPHONE ENCOUNTER
Request from [facility] to submit PA through [employer] per patient's plan.     PA submitted through [employer] and pending.   Key: [id number]

## 2021-08-04 NOTE — TELEPHONE ENCOUNTER
See note of 8/2.    Call to [facility] and spoke with Leona.  Advise alonzo being pursued instead of simponi.  Verb understanding.

## 2021-08-04 NOTE — TELEPHONE ENCOUNTER
----- Message from Nadja Bruno sent at 8/3/2021  3:29 PM EDT -----  Regarding: Medication being denied  Contact: [phone]  Randal with [facility] calling regarding prescription Golimumab (Simponi) 100 MG/ML solution auto-injector being denied. Please contact to advise what the next steps will be taken for prescription for pt.

## 2021-08-17 ENCOUNTER — TELEPHONE (OUTPATIENT)
Dept: GASTROENTEROLOGY | Facility: CLINIC | Age: 66
End: 2021-08-17

## 2021-08-17 NOTE — TELEPHONE ENCOUNTER
----- Message from Amber Sesay sent at 8/17/2021  2:19 PM EDT -----  Regarding: Prescription Question  Contact: [phone]  Just a follow up from Amber's visit with Dr Lucia. He changed Amber's Apriso  ER (mesalamine 35 ) to 4 pills per day instead of 2. Did the [employer] fax request for same get filled out? We will need this filled very soon.    Also Dr. Lucia has approved Amber for Humira. Now we are trying to qualify to get help paying for it. There was a form faxed to your office by [employer] that we are waiting on to be filled out. Do you know if this got done?    Thank you again for your help with everything.    PS: Amber was still able to get the Simponi 100 shot yesterday, so she hasn't been cut off yet, even though the paper work appears to be heading in that direction.     Tristian and Amber Sesay

## 2021-08-18 ENCOUNTER — TELEPHONE (OUTPATIENT)
Dept: GASTROENTEROLOGY | Facility: CLINIC | Age: 66
End: 2021-08-18

## 2021-08-18 RX ORDER — MESALAMINE 0.38 G/1
CAPSULE, EXTENDED RELEASE ORAL
Qty: 120 CAPSULE | Refills: 3 | Status: SHIPPED | OUTPATIENT
Start: 2021-08-18 | End: 2021-12-20

## 2021-08-18 NOTE — TELEPHONE ENCOUNTER
----- Message from Nadja Gilliam Rep sent at 8/18/2021  9:58 AM EDT -----  Regarding: increase meds and application for assistance  Contact: [phone]  Spoke with pt's  Tristian Sesay in regards to pharmacists increase per Dr Lucia request on medication apriso 375 from 2 pills per day to 4 to make sure he's done that please respond to [employer] as soon as possible Dr Lucia switched her to Humira needs application filled out for medication assistance. It was faxed yesterday for the 2nd time  Application name is [employer].  asked could someone please return his call soon as possible

## 2021-08-18 NOTE — TELEPHONE ENCOUNTER
"See o/v of 7/26: \"Increase the Mesalamine to 4/day. \"  Escribe completed for apriso 375 mg - take 4 tabs by mouth daily #120, R3.      Call to pt.  Advise of above.      Advise that Cristy HAYWARD working on alonzo zepeda  Verb understanding.     Message to Cristy AHYWARD.   "

## 2021-08-19 NOTE — TELEPHONE ENCOUNTER
Called and informed patient's  that the paperwork was faxed successfully yesterday and will call him when we get response. He verbalized understanding.

## 2021-10-27 ENCOUNTER — OFFICE VISIT (OUTPATIENT)
Dept: GASTROENTEROLOGY | Facility: CLINIC | Age: 66
End: 2021-10-27

## 2021-10-27 VITALS
BODY MASS INDEX: 21.79 KG/M2 | HEART RATE: 72 BPM | DIASTOLIC BLOOD PRESSURE: 85 MMHG | WEIGHT: 123 LBS | OXYGEN SATURATION: 96 % | HEIGHT: 63 IN | TEMPERATURE: 96 F | SYSTOLIC BLOOD PRESSURE: 134 MMHG

## 2021-10-27 DIAGNOSIS — D84.9 IMMUNOCOMPROMISED (HCC): ICD-10-CM

## 2021-10-27 DIAGNOSIS — K51.011 ULCERATIVE PANCOLITIS WITH RECTAL BLEEDING (HCC): Primary | ICD-10-CM

## 2021-10-27 PROCEDURE — 99214 OFFICE O/P EST MOD 30 MIN: CPT | Performed by: INTERNAL MEDICINE

## 2021-10-27 NOTE — PROGRESS NOTES
"Chief Complaint   Patient presents with   • Ulcerative Colitis       History of Present Illness:   65 y.o. female from [place], [place]. with a history of left-sided ulcerative colitis diagnosed at 34 years of age, and is on Humira x 6 weeks (every 2 weeks), Mesalamine 2 BID. Not on Canasa suppositories.. She last had a colonoscopy 5/12/21 by a Surgeon in [place], [place] (@ [facility]) that showed \"colitis\". Patient had breast cancer with lumpectomy in January 2018 and radiation therapy.       She was last seen in 7 of 2021.  My assessment and plan at that time was as follows:  Assessment:  1. UC - on Simponi q 2 weeks (now) and Mesalamine ER. Her insurance has refused Simponi.   2.      Recommendations:  1. Increase the Mesalamine to 4/day. Continue the mesalamine suppositories q HS.   2. Lets start her on Humira every 2 weeks. (stop Simponi)  3. F/u 3 mos.        She feels good. NO diarrhea. No rectal bleeding or melena. NO abdominal or chest pain. No nausea or vomiting. No fevers, chills, no night sweats. . Weight stable.     Past Medical History:   Diagnosis Date   • Anemia 2014    recovered   • Anxiety    • Breast cancer (HCC)     LEFT 2017   • Cervical cancer (HCC) 1990    LEFT BREAST 2017   • Chronic pancolonic ulcerative colitis (HCC)     DIAGNOSED 25 YEARS AGO   • History of transfusion    • Lactose intolerance    • PONV (postoperative nausea and vomiting)        Past Surgical History:   Procedure Laterality Date   • BREAST BIOPSY     • BREAST LUMPECTOMY Left 1/11/2018    Procedure: LEFT needle localized lumpectomy;  Surgeon: Anne Harvey MD;  Location: [facility] OR [facility] [place];  Service:    • COLONOSCOPY  03/20/2015    MILD TO MODERATE PANCOLITIS W/SCARRING THROUGHOUT COLON, IH, ACUTE CRYPTITIS, ACTIVE COLITIS   • COLONOSCOPY N/A 4/25/2017    Lots of scarring throughout the colon from her long h/o of ulcerative colitis, mild left sided ulcerative colitis, IH.  PATH: mild chronic active colitis.     • " COLONOSCOPY N/A 6/14/2019    ileum was normal, episodic scarring throughout the colon, mild colitis in the rectum, IH   • COLONOSCOPY  05/12/2021    Crohns vs ulcerative colitis- Sunday Medellin M.D.   • ENDOSCOPY  03/20/2015    ERYTHEMATOUS MUCOSA IN STOMACH, LEIOMYOMA, REACTIVE MUCOSAL CHANGES, CHRONIC INFLAMMATION   • HYSTERECTOMY     • UPPER GASTROINTESTINAL ENDOSCOPY  03/20/2015    COMPLETED BY DR. MATT FIGUEROA         Current Outpatient Medications:   •  calcium carbonate (OS-LIANE) 600 MG tablet, Take 600 mg by mouth Daily., Disp: , Rfl:   •  Cholecalciferol (VITAMIN D3) 5000 units capsule capsule, Take 5,000 Units by mouth Daily., Disp: , Rfl:   •  escitalopram (LEXAPRO) 5 MG tablet, , Disp: , Rfl:   •  fluticasone (FLONASE) 50 MCG/ACT nasal spray, As Needed., Disp: , Rfl:   •  mesalamine (APRISO) 0.375 g 24 hr capsule, Take 4 capsules by mouth daily, Disp: 120 capsule, Rfl: 3  •  mesalamine (CANASA) 1000 MG suppository, Insert 1 suppository into the rectum Every Night., Disp: 30 suppository, Rfl: 11  •  tamoxifen (NOLVADEX) 20 MG chemo tablet, , Disp: , Rfl:   •  Golimumab (Simponi) 100 MG/ML solution auto-injector, Inject 100 mg under the skin into the appropriate area as directed Every 28 (Twenty-Eight) Days., Disp: 1 each, Rfl: 6    No Known Allergies    Family History   Problem Relation Age of Onset   • Liver cancer Mother    • Breast cancer Sister 73   • Breast cancer Other 38        neice gene negative    • Cancer Sister         of unknown orgin    • Malig Hyperthermia Neg Hx        Social History     Socioeconomic History   • Marital status:    Tobacco Use   • Smoking status: Never Smoker   • Smokeless tobacco: Never Used   Substance and Sexual Activity   • Alcohol use: Yes     Alcohol/week: 1.0 standard drink     Types: 1 Glasses of wine per week     Comment: on occ   • Drug use: No   • Sexual activity: Not Currently     Partners: Male     Birth control/protection: Post-menopausal     Comment:  Occasionally active with spouse       Review of Systems   Gastrointestinal: Negative for abdominal pain.   All other systems reviewed and are negative.    Pertinent positives and negatives documented in the HPI and all other systems reviewed and were found to be negative.  Vitals:    10/27/21 1350   BP: 134/85   Pulse: 72   Temp: 96 °F (35.6 °C)   SpO2: 96%       Physical Exam  Vitals reviewed.   Constitutional:       General: She is not in acute distress.     Appearance: Normal appearance. She is well-developed. She is not diaphoretic.   HENT:      Head: Normocephalic and atraumatic. Hair is normal.      Right Ear: Hearing, tympanic membrane, ear canal and external ear normal. No decreased hearing noted. No drainage.      Left Ear: Hearing, tympanic membrane, ear canal and external ear normal. No decreased hearing noted.      Nose: Nose normal. No nasal deformity.      Mouth/Throat:      Mouth: Mucous membranes are moist.   Eyes:      General: Lids are normal.         Right eye: No discharge.         Left eye: No discharge.      Extraocular Movements: Extraocular movements intact.      Conjunctiva/sclera: Conjunctivae normal.      Pupils: Pupils are equal, round, and reactive to light.   Neck:      Thyroid: No thyromegaly.      Vascular: No JVD.      Trachea: No tracheal deviation.   Cardiovascular:      Rate and Rhythm: Normal rate and regular rhythm.      Pulses: Normal pulses.      Heart sounds: Normal heart sounds. No murmur heard.  No friction rub. No gallop.    Pulmonary:      Effort: Pulmonary effort is normal. No respiratory distress.      Breath sounds: Normal breath sounds. No wheezing or rales.   Chest:      Chest wall: No tenderness.   Abdominal:      General: Bowel sounds are normal. There is no distension.      Palpations: Abdomen is soft. There is no mass.      Tenderness: There is no abdominal tenderness. There is no guarding or rebound.      Hernia: No hernia is present.   Musculoskeletal:          General: No tenderness or deformity. Normal range of motion.      Cervical back: Normal range of motion and neck supple.   Lymphadenopathy:      Cervical: No cervical adenopathy.   Skin:     General: Skin is warm and dry.      Findings: No erythema or rash.   Neurological:      Mental Status: She is alert and oriented to person, place, and time.      Cranial Nerves: No cranial nerve deficit.      Motor: No abnormal muscle tone.      Coordination: Coordination normal.      Deep Tendon Reflexes: Reflexes are normal and symmetric. Reflexes normal.   Psychiatric:         Mood and Affect: Mood normal.         Behavior: Behavior normal.         Thought Content: Thought content normal.         Judgment: Judgment normal.         Diagnoses and all orders for this visit:    1. Ulcerative pancolitis with rectal bleeding (HCC) (Primary)  -     CBC & Differential  -     Comprehensive Metabolic Panel  -     Celiac Ab tTG DGP TIgA  -     C-reactive Protein  -     Sedimentation Rate    2. Immunocompromised (HCC)  -     CBC & Differential  -     Comprehensive Metabolic Panel  -     Celiac Ab tTG DGP TIgA  -     C-reactive Protein  -     Sedimentation Rate      Assessment:  1. 65 y.o. female from [place], [place]. with a history of left-sided ulcerative colitis diagnosed at 34 years of age, and is on Humira x 6 weeks (every 2 weeks), Mesalamine 2 BID.  2.     Recommendations:  1. Continue Humira and mesalamine  2. Labs.  3. F/u 6 mos. Repeat c/s in the summer of 2022.    Return in about 6 months (around 4/27/2022).    Joni Lucia MD  10/27/2021

## 2021-10-28 LAB
ALBUMIN SERPL-MCNC: 4.4 G/DL (ref 3.8–4.8)
ALBUMIN/GLOB SERPL: 1.3 {RATIO} (ref 1.2–2.2)
ALP SERPL-CCNC: 52 IU/L (ref 44–121)
ALT SERPL-CCNC: 10 IU/L (ref 0–32)
AST SERPL-CCNC: 17 IU/L (ref 0–40)
BASOPHILS # BLD AUTO: 0.1 X10E3/UL (ref 0–0.2)
BASOPHILS NFR BLD AUTO: 1 %
BILIRUB SERPL-MCNC: <0.2 MG/DL (ref 0–1.2)
BUN SERPL-MCNC: 15 MG/DL (ref 8–27)
BUN/CREAT SERPL: 23 (ref 12–28)
CALCIUM SERPL-MCNC: 9.7 MG/DL (ref 8.7–10.3)
CHLORIDE SERPL-SCNC: 103 MMOL/L (ref 96–106)
CO2 SERPL-SCNC: 25 MMOL/L (ref 20–29)
CREAT SERPL-MCNC: 0.65 MG/DL (ref 0.57–1)
CRP SERPL-MCNC: <1 MG/L (ref 0–10)
EOSINOPHIL # BLD AUTO: 0.2 X10E3/UL (ref 0–0.4)
EOSINOPHIL NFR BLD AUTO: 2 %
ERYTHROCYTE [DISTWIDTH] IN BLOOD BY AUTOMATED COUNT: 13.1 % (ref 11.7–15.4)
ERYTHROCYTE [SEDIMENTATION RATE] IN BLOOD BY WESTERGREN METHOD: 15 MM/HR (ref 0–40)
GLIADIN PEPTIDE IGA SER-ACNC: 8 UNITS (ref 0–19)
GLIADIN PEPTIDE IGG SER-ACNC: 2 UNITS (ref 0–19)
GLOBULIN SER CALC-MCNC: 3.3 G/DL (ref 1.5–4.5)
GLUCOSE SERPL-MCNC: 86 MG/DL (ref 65–99)
HCT VFR BLD AUTO: 42.9 % (ref 34–46.6)
HGB BLD-MCNC: 14.6 G/DL (ref 11.1–15.9)
IGA SERPL-MCNC: 374 MG/DL (ref 87–352)
IMM GRANULOCYTES # BLD AUTO: 0 X10E3/UL (ref 0–0.1)
IMM GRANULOCYTES NFR BLD AUTO: 0 %
LYMPHOCYTES # BLD AUTO: 3.2 X10E3/UL (ref 0.7–3.1)
LYMPHOCYTES NFR BLD AUTO: 43 %
MCH RBC QN AUTO: 31 PG (ref 26.6–33)
MCHC RBC AUTO-ENTMCNC: 34 G/DL (ref 31.5–35.7)
MCV RBC AUTO: 91 FL (ref 79–97)
MONOCYTES # BLD AUTO: 0.6 X10E3/UL (ref 0.1–0.9)
MONOCYTES NFR BLD AUTO: 9 %
NEUTROPHILS # BLD AUTO: 3.4 X10E3/UL (ref 1.4–7)
NEUTROPHILS NFR BLD AUTO: 45 %
PLATELET # BLD AUTO: 266 X10E3/UL (ref 150–450)
POTASSIUM SERPL-SCNC: 4.7 MMOL/L (ref 3.5–5.2)
PROT SERPL-MCNC: 7.7 G/DL (ref 6–8.5)
RBC # BLD AUTO: 4.71 X10E6/UL (ref 3.77–5.28)
SODIUM SERPL-SCNC: 142 MMOL/L (ref 134–144)
TTG IGA SER-ACNC: <2 U/ML (ref 0–3)
TTG IGG SER-ACNC: <2 U/ML (ref 0–5)
WBC # BLD AUTO: 7.5 X10E3/UL (ref 3.4–10.8)

## 2021-10-29 NOTE — PROGRESS NOTES
10/29/21  Tell her that her lab work came back looking good.  Please send a copy of this report to her PCP.  Raz webster

## 2021-11-01 ENCOUNTER — TELEPHONE (OUTPATIENT)
Dept: GASTROENTEROLOGY | Facility: CLINIC | Age: 66
End: 2021-11-01

## 2021-11-01 NOTE — TELEPHONE ENCOUNTER
----- Message from Joni Lucia MD sent at 10/29/2021  5:46 PM EDT -----  10/29/21  Tell her that her lab work came back looking good.  Please send a copy of this report to her PCP.  Raz webster

## 2021-11-01 NOTE — TELEPHONE ENCOUNTER
VM to pt.  Per hipaa release, advise per Dr Lucia note.  Contact office if any questions.     Labs faxed via epic to Dr Dora Ross.

## 2021-12-20 RX ORDER — MESALAMINE 0.38 G/1
CAPSULE, EXTENDED RELEASE ORAL
Qty: 120 CAPSULE | Refills: 11 | Status: SHIPPED | OUTPATIENT
Start: 2021-12-20 | End: 2022-12-13

## 2022-04-27 ENCOUNTER — TELEPHONE (OUTPATIENT)
Dept: GASTROENTEROLOGY | Facility: CLINIC | Age: 67
End: 2022-04-27

## 2022-04-27 ENCOUNTER — OFFICE VISIT (OUTPATIENT)
Dept: GASTROENTEROLOGY | Facility: CLINIC | Age: 67
End: 2022-04-27

## 2022-04-27 VITALS
TEMPERATURE: 96.3 F | SYSTOLIC BLOOD PRESSURE: 117 MMHG | HEIGHT: 61 IN | HEART RATE: 69 BPM | WEIGHT: 126 LBS | DIASTOLIC BLOOD PRESSURE: 78 MMHG | BODY MASS INDEX: 23.79 KG/M2 | OXYGEN SATURATION: 98 %

## 2022-04-27 DIAGNOSIS — D84.9 IMMUNOCOMPROMISED: ICD-10-CM

## 2022-04-27 DIAGNOSIS — K51.011 ULCERATIVE PANCOLITIS WITH RECTAL BLEEDING: Primary | ICD-10-CM

## 2022-04-27 PROCEDURE — 99214 OFFICE O/P EST MOD 30 MIN: CPT | Performed by: INTERNAL MEDICINE

## 2022-04-27 NOTE — PROGRESS NOTES
"Chief Complaint   Patient presents with   • Ulcerative pancolitis       History of Present Illness:   66 y.o. female from [place], [place]. with a history of left-sided ulcerative colitis diagnosed at 34 years of age, and is on Humira x 6 mos (every 2 weeks), Mesalamine 2 BID. Not on Canasa suppositories.. She last had a colonoscopy 5/12/21 by a Surgeon in [place], [place] (@ [facility]) that showed \"colitis\". Patient had breast cancer with lumpectomy in January 2018 and radiation therapy.       She was last seen in 10/21:  Assessment:  1. 65 y.o. female from [place] with a history of left-sided ulcerative colitis diagnosed at 34 years of age, and is on Humira x 6 weeks (every 2 weeks), Mesalamine 2 BID.  2.      Recommendations:  1. Continue Humira and mesalamine  2. Labs.  3. F/u 6 mos. Repeat c/s in the summer of 2022.        She had a false positive PPD a number of years ago and saw a Pulmonary MD. Her Quantiferon Gold test has always been negative.        Doing well, no rectal bleeding or diarrhea. No constiaption. NO abdominal or chest pain. No fevers, chills, night sweats.         She was told by her Oncologist to take a baby aspirin/day but she has concerns.     Past Medical History:   Diagnosis Date   • Anemia 2014    recovered   • Anxiety    • Breast cancer (HCC)     LEFT 2017   • Cervical cancer (HCC) 1990    LEFT BREAST 2017   • Chronic pancolonic ulcerative colitis (HCC)     DIAGNOSED 25 YEARS AGO   • History of transfusion    • Lactose intolerance    • PONV (postoperative nausea and vomiting)        Past Surgical History:   Procedure Laterality Date   • BREAST BIOPSY     • BREAST LUMPECTOMY Left 1/11/2018    Procedure: LEFT needle localized lumpectomy;  Surgeon: Anne Harvey MD;  [place] [facility] [place] [facility] [place]  [place]:    • COLONOSCOPY  03/20/2015    MILD TO MODERATE PANCOLITIS W/SCARRING THROUGHOUT COLON, IH, ACUTE CRYPTITIS, ACTIVE COLITIS   • COLONOSCOPY N/A 4/25/2017    Lots of scarring " throughout the colon from her long h/o of ulcerative colitis, mild left sided ulcerative colitis, IH.  PATH: mild chronic active colitis.     • COLONOSCOPY N/A 6/14/2019    ileum was normal, episodic scarring throughout the colon, mild colitis in the rectum, IH   • COLONOSCOPY  05/12/2021    Crohns vs ulcerative colitis- Sunday Medellin M.D.   • ENDOSCOPY  03/20/2015    ERYTHEMATOUS MUCOSA IN STOMACH, LEIOMYOMA, REACTIVE MUCOSAL CHANGES, CHRONIC INFLAMMATION   • HYSTERECTOMY     • UPPER GASTROINTESTINAL ENDOSCOPY  03/20/2015    COMPLETED BY DR. MATT FIGUEROA         Current Outpatient Medications:   •  calcium carbonate (OS-LIANE) 600 MG tablet, Take 600 mg by mouth Daily., Disp: , Rfl:   •  Cholecalciferol (VITAMIN D3) 5000 units capsule capsule, Take 5,000 Units by mouth Daily., Disp: , Rfl:   •  escitalopram (LEXAPRO) 5 MG tablet, , Disp: , Rfl:   •  fluticasone (FLONASE) 50 MCG/ACT nasal spray, As Needed., Disp: , Rfl:   •  mesalamine (APRISO) 0.375 g 24 hr capsule, TAKE FOUR CAPSULES BY MOUTH DAILY, Disp: 120 capsule, Rfl: 11  •  mesalamine (CANASA) 1000 MG suppository, Insert 1 suppository into the rectum Every Night., Disp: 30 suppository, Rfl: 11  •  tamoxifen (NOLVADEX) 20 MG chemo tablet, , Disp: , Rfl:     No Known Allergies    Family History   Problem Relation Age of Onset   • Liver cancer Mother    • Breast cancer Sister 73   • Breast cancer Other 38        neice gene negative    • Cancer Sister         of unknown orgin    • Malig Hyperthermia Neg Hx        Social History     Socioeconomic History   • Marital status:    Tobacco Use   • Smoking status: Never Smoker   • Smokeless tobacco: Never Used   Substance and Sexual Activity   • Alcohol use: Yes     Alcohol/week: 1.0 standard drink     Types: 1 Glasses of wine per week     Comment: on occ   • Drug use: No   • Sexual activity: Not Currently     Partners: Male     Birth control/protection: Post-menopausal     Comment: Occasionally active with spouse        Review of Systems   Gastrointestinal: Negative for abdominal pain and blood in stool.   All other systems reviewed and are negative.    Pertinent positives and negatives documented in the HPI and all other systems reviewed and were found to be negative.  Vitals:    04/27/22 1314   BP: 117/78   Pulse: 69   Temp: 96.3 °F (35.7 °C)   SpO2: 98%       Physical Exam  Vitals reviewed.   Constitutional:       General: She is not in acute distress.     Appearance: Normal appearance. She is well-developed. She is not diaphoretic.   HENT:      Head: Normocephalic and atraumatic. Hair is normal.      Right Ear: Hearing, tympanic membrane, ear canal and external ear normal. No decreased hearing noted. No drainage.      Left Ear: Hearing, tympanic membrane, ear canal and external ear normal. No decreased hearing noted.      Nose: Nose normal. No nasal deformity.      Mouth/Throat:      Mouth: Mucous membranes are moist.   Eyes:      General: Lids are normal.         Right eye: No discharge.         Left eye: No discharge.      Extraocular Movements: Extraocular movements intact.      Conjunctiva/sclera: Conjunctivae normal.      Pupils: Pupils are equal, round, and reactive to light.   Neck:      Thyroid: No thyromegaly.      Vascular: No JVD.      Trachea: No tracheal deviation.   Cardiovascular:      Rate and Rhythm: Normal rate and regular rhythm.      Pulses: Normal pulses.      Heart sounds: Normal heart sounds. No murmur heard.    No friction rub. No gallop.   Pulmonary:      Effort: Pulmonary effort is normal. No respiratory distress.      Breath sounds: Normal breath sounds. No wheezing or rales.   Chest:      Chest wall: No tenderness.   Abdominal:      General: Bowel sounds are normal. There is no distension.      Palpations: Abdomen is soft. There is no mass.      Tenderness: There is no abdominal tenderness. There is no guarding or rebound.      Hernia: No hernia is present.   Musculoskeletal:         General:  No tenderness or deformity. Normal range of motion.      Cervical back: Normal range of motion and neck supple.   Lymphadenopathy:      Cervical: No cervical adenopathy.   Skin:     General: Skin is warm and dry.      Findings: No erythema or rash.   Neurological:      Mental Status: She is alert and oriented to person, place, and time.      Cranial Nerves: No cranial nerve deficit.      Motor: No abnormal muscle tone.      Coordination: Coordination normal.      Deep Tendon Reflexes: Reflexes are normal and symmetric. Reflexes normal.   Psychiatric:         Mood and Affect: Mood normal.         Behavior: Behavior normal.         Thought Content: Thought content normal.         Judgment: Judgment normal.         Diagnoses and all orders for this visit:    1. Ulcerative pancolitis with rectal bleeding (HCC) (Primary)  -     CBC & Differential  -     Comprehensive Metabolic Panel  -     QuantiFERON TB Gold    2. Immunocompromised (HCC)  -     CBC & Differential  -     Comprehensive Metabolic Panel  -     QuantiFERON TB Gold      Assessment:  1. 65 y.o. female from [place], [place]. with a history of left-sided ulcerative colitis diagnosed at 34 years of age, and is on Humira (every 2 weeks), Mesalamine 2 BID.    Recommendations:  1. Colonoscopy  2. Labs: Quantiferon Gold, CBC, CMP  3.     No follow-ups on file.    Joni Lucia MD  4/27/2022

## 2022-04-27 NOTE — TELEPHONE ENCOUNTER
FLO patient in office for colonoscopy. Scheduled 08/23/2022 with arrival time 10:00am. Prep packet handed to patient. Also advised arrival time may vary based on [facility] guidelines. FLO [place]--Itzel

## 2022-04-28 ENCOUNTER — TELEPHONE (OUTPATIENT)
Dept: GASTROENTEROLOGY | Facility: CLINIC | Age: 67
End: 2022-04-28

## 2022-04-28 LAB
ALBUMIN SERPL-MCNC: 4.6 G/DL (ref 3.8–4.8)
ALBUMIN/GLOB SERPL: 1.4 {RATIO} (ref 1.2–2.2)
ALP SERPL-CCNC: 48 IU/L (ref 44–121)
ALT SERPL-CCNC: 11 IU/L (ref 0–32)
AST SERPL-CCNC: 19 IU/L (ref 0–40)
BASOPHILS # BLD AUTO: 0.1 X10E3/UL (ref 0–0.2)
BASOPHILS NFR BLD AUTO: 1 %
BILIRUB SERPL-MCNC: 0.3 MG/DL (ref 0–1.2)
BUN SERPL-MCNC: 12 MG/DL (ref 8–27)
BUN/CREAT SERPL: 18 (ref 12–28)
CALCIUM SERPL-MCNC: 9.5 MG/DL (ref 8.7–10.3)
CHLORIDE SERPL-SCNC: 100 MMOL/L (ref 96–106)
CO2 SERPL-SCNC: 23 MMOL/L (ref 20–29)
CREAT SERPL-MCNC: 0.67 MG/DL (ref 0.57–1)
EGFRCR SERPLBLD CKD-EPI 2021: 96 ML/MIN/1.73
EOSINOPHIL # BLD AUTO: 0.2 X10E3/UL (ref 0–0.4)
EOSINOPHIL NFR BLD AUTO: 2 %
ERYTHROCYTE [DISTWIDTH] IN BLOOD BY AUTOMATED COUNT: 13.1 % (ref 11.7–15.4)
GLOBULIN SER CALC-MCNC: 3.2 G/DL (ref 1.5–4.5)
GLUCOSE SERPL-MCNC: 81 MG/DL (ref 65–99)
HCT VFR BLD AUTO: 43.7 % (ref 34–46.6)
HGB BLD-MCNC: 15 G/DL (ref 11.1–15.9)
IMM GRANULOCYTES # BLD AUTO: 0 X10E3/UL (ref 0–0.1)
IMM GRANULOCYTES NFR BLD AUTO: 0 %
LYMPHOCYTES # BLD AUTO: 3.8 X10E3/UL (ref 0.7–3.1)
LYMPHOCYTES NFR BLD AUTO: 48 %
MCH RBC QN AUTO: 31.6 PG (ref 26.6–33)
MCHC RBC AUTO-ENTMCNC: 34.3 G/DL (ref 31.5–35.7)
MCV RBC AUTO: 92 FL (ref 79–97)
MONOCYTES # BLD AUTO: 0.7 X10E3/UL (ref 0.1–0.9)
MONOCYTES NFR BLD AUTO: 9 %
NEUTROPHILS # BLD AUTO: 3.1 X10E3/UL (ref 1.4–7)
NEUTROPHILS NFR BLD AUTO: 40 %
PLATELET # BLD AUTO: 246 X10E3/UL (ref 150–450)
POTASSIUM SERPL-SCNC: 4.7 MMOL/L (ref 3.5–5.2)
PROT SERPL-MCNC: 7.8 G/DL (ref 6–8.5)
RBC # BLD AUTO: 4.75 X10E6/UL (ref 3.77–5.28)
SODIUM SERPL-SCNC: 140 MMOL/L (ref 134–144)
WBC # BLD AUTO: 7.9 X10E3/UL (ref 3.4–10.8)

## 2022-04-28 NOTE — PROGRESS NOTES
04/28/22       Tell her that the lab work that is back so far looks normal.       please send a copy of this report to her PCP.  Raz webster

## 2022-04-28 NOTE — TELEPHONE ENCOUNTER
----- Message from Joni Lucia MD sent at 4/28/2022  7:14 AM EDT -----  04/28/22       Tell her that the lab work that is back so far looks normal.       please send a copy of this report to her PCP.  Raz webster

## 2022-04-28 NOTE — TELEPHONE ENCOUNTER
Called pt and on identified vm advised of Dr Lucia's note. Advised to call with questions.     Results sent to Dr Robbins's thru [employer].

## 2022-04-29 LAB
GAMMA INTERFERON BACKGROUND BLD IA-ACNC: 0.1 IU/ML
M TB IFN-G BLD-IMP: NEGATIVE
M TB IFN-G CD4+ BCKGRND COR BLD-ACNC: 0.06 IU/ML
M TB IFN-G CD4+CD8+ BCKGRND COR BLD-ACNC: 0.11 IU/ML
MITOGEN IGNF BLD-ACNC: >10 IU/ML
QUANTIFERON INCUBATION: NORMAL
SERVICE CMNT-IMP: NORMAL

## 2022-04-30 NOTE — PROGRESS NOTES
04/30/22       Tell her that her TB test (Quantiferon Gold) came back negative, which is good.        Send a copy of this report to her PCP.   Raz webster

## 2022-05-02 ENCOUNTER — TELEPHONE (OUTPATIENT)
Dept: GASTROENTEROLOGY | Facility: CLINIC | Age: 67
End: 2022-05-02

## 2022-05-02 NOTE — TELEPHONE ENCOUNTER
----- Message from Joni Lucia MD sent at 4/30/2022 12:45 PM EDT -----  04/30/22       Tell her that her TB test (Quantiferon Gold) came back negative, which is good.        Send a copy of this report to her PCP.   Raz webster

## 2022-05-21 NOTE — TELEPHONE ENCOUNTER
Call from pt.  Advise that Dr Lucia has authorized Apriso with one year refill.  Pt verb understanding and [facility]    Víctor hooper.   weight-bearing as tolerated

## 2022-06-30 NOTE — TELEPHONE ENCOUNTER
----- Message from Nadja Leung sent at 4/2/2021  9:51 AM EDT -----  Contact: [phone]  Pt returned call    Pt would like to schedule colonoscopy as soon as she can. [phone]     Patients CT was denied through her [employer]. . it stated:     \"Your doctor told us that you have a condition that makes breathing hard (chronic obstructive pulmonary disease or COPD). Your doctor ordered a CT scan of your chest. A CT is a way to take pictures of the inside of your body. It is not medically necessary to use a CT scan for this condition unless you have had a chest x- ray with abnormal findings that need to be checked with a CT scan. We reviewed the notes we have. The notes do not show that you had a recent chest x-ray with such abnormalities. Based on the information we have, we cannot approve this request.\"     Are you able to put in a CXR order so that she can get this done and I can fax it over to her insurance company to get it approved? Thanks!

## 2022-08-16 ENCOUNTER — TELEPHONE (OUTPATIENT)
Dept: GASTROENTEROLOGY | Facility: CLINIC | Age: 67
End: 2022-08-16

## 2022-08-16 NOTE — TELEPHONE ENCOUNTER
Caller: Amber Sesay    Relationship: Self    Best call back number: [phone]    What is the best time to reach you: ANYTIME     Who are you requesting to speak with (clinical staff, provider,  specific staff member): CLINICAL STAFF      What was the call regarding: PATIENT HAS A COLONOSCOPY SCHEDULED 8/23 BUT TESTED POSITIVE FOR COVID 8/15 HER 5 DAYS QUARANTINE IS UP Saturday 8/20. SHE WANTS TO KNOW IF SHE NEEDS TO RESCHEDULE OR IS SHE OKAY BECAUSE SHE'S OUTSIDE HER QUARANTINE DATE

## 2022-08-17 NOTE — TELEPHONE ENCOUNTER
"I would think as long as she is feeling better, and is afebrile that she should be \"OK\" to have her colonoscopy on 8/23. Lisandrox. stone  "

## 2022-08-22 ENCOUNTER — TELEPHONE (OUTPATIENT)
Dept: GASTROENTEROLOGY | Facility: CLINIC | Age: 67
End: 2022-08-22

## 2022-08-22 NOTE — TELEPHONE ENCOUNTER
FLO patient via telephone for. Scheduled 10/12/2022 with arrival time of 10:30am. Prep paperwork mailed to verified address on file. Patient advised arrival time may change based on [facility] guidelines. FLO [place]

## 2022-08-29 ENCOUNTER — TELEPHONE (OUTPATIENT)
Dept: GASTROENTEROLOGY | Facility: CLINIC | Age: 67
End: 2022-08-29

## 2022-08-29 NOTE — TELEPHONE ENCOUNTER
----- Message from Amber Sesay sent at 8/29/2022  9:58 AM EDT -----  Regarding: Humira Prescription  [facility] is waiting for your office to update the prescription for Humira. They faxed it around the middle of August and will fax again today, August 29th. Amber will need her next injection on September 9th.   Thank you!!  Amber Sesay

## 2022-08-31 NOTE — TELEPHONE ENCOUNTER
Yes please refill the Humira to giive her enough refills to get her thru till the end of the year. Thx.kjjh

## 2022-10-12 ENCOUNTER — HOSPITAL ENCOUNTER (OUTPATIENT)
Facility: HOSPITAL | Age: 67
Setting detail: HOSPITAL OUTPATIENT SURGERY
Discharge: HOME | End: 2022-10-12
Attending: INTERNAL MEDICINE | Admitting: INTERNAL MEDICINE

## 2022-10-12 ENCOUNTER — ANESTHESIA EVENT (OUTPATIENT)
Dept: GASTROENTEROLOGY | Facility: HOSPITAL | Age: 67
End: 2022-10-12

## 2022-10-12 ENCOUNTER — ANESTHESIA (OUTPATIENT)
Dept: GASTROENTEROLOGY | Facility: HOSPITAL | Age: 67
End: 2022-10-12

## 2022-10-12 VITALS
DIASTOLIC BLOOD PRESSURE: 91 MMHG | HEIGHT: 61 IN | WEIGHT: 123 LBS | HEART RATE: 68 BPM | RESPIRATION RATE: 16 BRPM | OXYGEN SATURATION: 98 % | SYSTOLIC BLOOD PRESSURE: 111 MMHG | BODY MASS INDEX: 23.22 KG/M2

## 2022-10-12 DIAGNOSIS — K51.011 ULCERATIVE PANCOLITIS WITH RECTAL BLEEDING: ICD-10-CM

## 2022-10-12 PROCEDURE — 25010000002 PROPOFOL 10 MG/ML EMULSION: Performed by: ANESTHESIOLOGY

## 2022-10-12 PROCEDURE — 45380 COLONOSCOPY AND BIOPSY: CPT | Performed by: INTERNAL MEDICINE

## 2022-10-12 PROCEDURE — 88305 TISSUE EXAM BY PATHOLOGIST: CPT | Performed by: INTERNAL MEDICINE

## 2022-10-12 RX ORDER — PROPOFOL 10 MG/ML
VIAL (ML) INTRAVENOUS AS NEEDED
Status: DISCONTINUED | OUTPATIENT
Start: 2022-10-12 | End: 2022-10-12 | Stop reason: SURG

## 2022-10-12 RX ORDER — SODIUM CHLORIDE, SODIUM LACTATE, POTASSIUM CHLORIDE, CALCIUM CHLORIDE 600; 310; 30; 20 MG/100ML; MG/100ML; MG/100ML; MG/100ML
1000 INJECTION, SOLUTION INTRAVENOUS CONTINUOUS
Status: DISCONTINUED | OUTPATIENT
Start: 2022-10-12 | End: 2022-10-12 | Stop reason: HOSPADM

## 2022-10-12 RX ORDER — LIDOCAINE HYDROCHLORIDE 20 MG/ML
INJECTION, SOLUTION INFILTRATION; PERINEURAL AS NEEDED
Status: DISCONTINUED | OUTPATIENT
Start: 2022-10-12 | End: 2022-10-12 | Stop reason: SURG

## 2022-10-12 RX ORDER — SODIUM CHLORIDE 0.9 % (FLUSH) 0.9 %
10 SYRINGE (ML) INJECTION AS NEEDED
Status: DISCONTINUED | OUTPATIENT
Start: 2022-10-12 | End: 2022-10-12 | Stop reason: HOSPADM

## 2022-10-12 RX ADMIN — PROPOFOL 140 MCG/KG/MIN: 10 INJECTION, EMULSION INTRAVENOUS at 09:43

## 2022-10-12 RX ADMIN — PROPOFOL 120 MG: 10 INJECTION, EMULSION INTRAVENOUS at 09:37

## 2022-10-12 RX ADMIN — SODIUM CHLORIDE, POTASSIUM CHLORIDE, SODIUM LACTATE AND CALCIUM CHLORIDE 1000 ML: 600; 310; 30; 20 INJECTION, SOLUTION INTRAVENOUS at 09:27

## 2022-10-12 RX ADMIN — LIDOCAINE HYDROCHLORIDE 30 MG: 20 INJECTION, SOLUTION INFILTRATION; PERINEURAL at 09:37

## 2022-10-12 NOTE — ANESTHESIA POSTPROCEDURE EVALUATION
"Patient: Amber Sesay    Procedure Summary     Date: 10/12/22 Room / Location: [facility] ENDOSCOPY 8 /  [facility]    Anesthesia Start: 0936 Anesthesia Stop: 1012    Procedure: COLONOSCOPY TO CECUM/TI WITH BIOPSY AND POLYPECTOMY ( COLD BX) Diagnosis:       Ulcerative pancolitis with rectal bleeding (HCC)      (Ulcerative pancolitis with rectal bleeding (HCC) [K51.011])    Surgeons: Joni Lucia MD Provider: Viri Golden MD    Anesthesia Type: MAC ASA Status: 2          Anesthesia Type: MAC    Vitals  Vitals Value Taken Time   /91 10/12/22 1030   Temp     Pulse 68 10/12/22 1030   Resp 16 10/12/22 1030   SpO2 98 % 10/12/22 1030           Post Anesthesia Care and Evaluation    Patient location during evaluation: PHASE II  Patient participation: complete - patient participated  Level of consciousness: awake and alert  Pain management: adequate    Airway patency: patent  Anesthetic complications: No anesthetic complications    Cardiovascular status: acceptable  Respiratory status: acceptable  Hydration status: acceptable    Comments: /91 (BP Location: Left arm, Patient Position: Lying)   Pulse 68   Resp 16   Ht 154.9 cm (61\")   Wt 55.8 kg (123 lb)   LMP  (LMP Unknown)   SpO2 98%   BMI 23.24 kg/m²         "

## 2022-10-12 NOTE — ANESTHESIA PREPROCEDURE EVALUATION
Anesthesia Evaluation     history of anesthetic complications: PONV               Airway   Mallampati: I  TM distance: >3 FB  Neck ROM: full  Dental - normal exam     Pulmonary    (-) asthma, shortness of breath, recent URI  Cardiovascular     (-) hypertension, dysrhythmias, angina, hyperlipidemia      Neuro/Psych  GI/Hepatic/Renal/Endo    (-) liver disease, no renal disease, diabetes    ROS Comment: Ulcerative Colitis    Musculoskeletal     Abdominal    Substance History      OB/GYN          Other      history of cancer (cervical, breast)                  Anesthesia Plan    ASA 2     MAC     intravenous induction     Anesthetic plan, risks, benefits, and alternatives have been provided, discussed and informed consent has been obtained with: patient.        CODE STATUS:

## 2022-10-12 NOTE — DISCHARGE INSTRUCTIONS
For the next 24 hours patient needs to be with a responsible adult.    For 24 hours DO NOT drive, operate machinery, appliances, drink alcohol, make important decisions or sign legal documents.    Start with a light or bland diet if you are feeling sick to your stomach otherwise advance to regular diet as tolerated.    Follow recommendations on procedure report if provided by your doctor.    Call Dr FIGUEROA for problems [phone]    Problems may include but not limited to: large amounts of bleeding, trouble breathing, repeated vomiting, severe unrelieved pain, fever or chills.

## 2022-10-12 NOTE — H&P
"Chief Complaint   Patient presents with   • Ulcerative pancolitis         History of Present Illness:   66 y.o. female from [place], [place]. with a history of left-sided ulcerative colitis diagnosed at 34 years of age, and is on Humira x 6 mos (every 2 weeks), Mesalamine 2 BID. Not on Canasa suppositories.. She last had a colonoscopy 5/12/21 by a Surgeon in [place], [place] (@ [facility]) that showed \"colitis\". Patient had breast cancer with lumpectomy in January 2018 and radiation therapy.       She was last seen in 10/21:  Assessment:  1. 65 y.o. female from [place], [place] with a history of left-sided ulcerative colitis diagnosed at 34 years of age, and is on Humira x 6 weeks (every 2 weeks), Mesalamine 2 BID.  2.      Recommendations:  1. Continue Humira and mesalamine  2. Labs.  3. F/u 6 mos. Repeat c/s in the summer of 2022.        She had a false positive PPD a number of years ago and saw a Pulmonary MD. Her Quantiferon Gold test has always been negative.        Doing well, no rectal bleeding or diarrhea. No constiaption. NO abdominal or chest pain. No fevers, chills, night sweats.         She was told by her Oncologist to take a baby aspirin/day but she has concerns.      Medical History        Past Medical History:   Diagnosis Date   • Anemia 2014     recovered   • Anxiety     • Breast cancer (HCC)       LEFT 2017   • Cervical cancer (HCC) 1990     LEFT BREAST 2017   • Chronic pancolonic ulcerative colitis (HCC)       DIAGNOSED 25 YEARS AGO   • History of transfusion     • Lactose intolerance     • PONV (postoperative nausea and vomiting)              Surgical History         Past Surgical History:   Procedure Laterality Date   • BREAST BIOPSY       • BREAST LUMPECTOMY Left 1/11/2018     Procedure: LEFT needle localized lumpectomy;  Surgeon: Anne Harvey MD;  Location: [facility] OR [facility];  Service:    • COLONOSCOPY   03/20/2015     MILD TO MODERATE PANCOLITIS W/SCARRING THROUGHOUT COLON, IH, ACUTE " CRYPTITIS, ACTIVE COLITIS   • COLONOSCOPY N/A 4/25/2017     Lots of scarring throughout the colon from her long h/o of ulcerative colitis, mild left sided ulcerative colitis, IH.  PATH: mild chronic active colitis.     • COLONOSCOPY N/A 6/14/2019     ileum was normal, episodic scarring throughout the colon, mild colitis in the rectum, IH   • COLONOSCOPY   05/12/2021     Crohns vs ulcerative colitis- Sunday Medellin M.D.   • ENDOSCOPY   03/20/2015     ERYTHEMATOUS MUCOSA IN STOMACH, LEIOMYOMA, REACTIVE MUCOSAL CHANGES, CHRONIC INFLAMMATION   • HYSTERECTOMY       • UPPER GASTROINTESTINAL ENDOSCOPY   03/20/2015     COMPLETED BY DR. MATT FIGUEROA               Current Outpatient Medications:   •  calcium carbonate (OS-LIANE) 600 MG tablet, Take 600 mg by mouth Daily., Disp: , Rfl:   •  Cholecalciferol (VITAMIN D3) 5000 units capsule capsule, Take 5,000 Units by mouth Daily., Disp: , Rfl:   •  escitalopram (LEXAPRO) 5 MG tablet, , Disp: , Rfl:   •  fluticasone (FLONASE) 50 MCG/ACT nasal spray, As Needed., Disp: , Rfl:   •  mesalamine (APRISO) 0.375 g 24 hr capsule, TAKE FOUR CAPSULES BY MOUTH DAILY, Disp: 120 capsule, Rfl: 11  •  mesalamine (CANASA) 1000 MG suppository, Insert 1 suppository into the rectum Every Night., Disp: 30 suppository, Rfl: 11  •  tamoxifen (NOLVADEX) 20 MG chemo tablet, , Disp: , Rfl:      No Known Allergies           Family History   Problem Relation Age of Onset   • Liver cancer Mother     • Breast cancer Sister 73   • Breast cancer Other 38         neice gene negative    • Cancer Sister           of unknown orgin    • Malig Hyperthermia Neg Hx           Social History   Social History            Socioeconomic History   • Marital status:    Tobacco Use   • Smoking status: Never Smoker   • Smokeless tobacco: Never Used   Substance and Sexual Activity   • Alcohol use: Yes       Alcohol/week: 1.0 standard drink       Types: 1 Glasses of wine per week       Comment: on occ   • Drug use: No   • Sexual  activity: Not Currently       Partners: Male       Birth control/protection: Post-menopausal       Comment: Occasionally active with spouse            Review of Systems   Gastrointestinal: Negative for abdominal pain and blood in stool.   All other systems reviewed and are negative.     Pertinent positives and negatives documented in the HPI and all other systems reviewed and were found to be negative.      Vitals:     04/27/22 1314   BP: 117/78   Pulse: 69   Temp: 96.3 °F (35.7 °C)   SpO2: 98%         Physical Exam  Vitals reviewed.   Constitutional:       General: She is not in acute distress.     Appearance: Normal appearance. She is well-developed. She is not diaphoretic.   HENT:      Head: Normocephalic and atraumatic. Hair is normal.      Right Ear: Hearing, tympanic membrane, ear canal and external ear normal. No decreased hearing noted. No drainage.      Left Ear: Hearing, tympanic membrane, ear canal and external ear normal. No decreased hearing noted.      Nose: Nose normal. No nasal deformity.      Mouth/Throat:      Mouth: Mucous membranes are moist.   Eyes:      General: Lids are normal.         Right eye: No discharge.         Left eye: No discharge.      Extraocular Movements: Extraocular movements intact.      Conjunctiva/sclera: Conjunctivae normal.      Pupils: Pupils are equal, round, and reactive to light.   Neck:      Thyroid: No thyromegaly.      Vascular: No JVD.      Trachea: No tracheal deviation.   Cardiovascular:      Rate and Rhythm: Normal rate and regular rhythm.      Pulses: Normal pulses.      Heart sounds: Normal heart sounds. No murmur heard.    No friction rub. No gallop.   Pulmonary:      Effort: Pulmonary effort is normal. No respiratory distress.      Breath sounds: Normal breath sounds. No wheezing or rales.   Chest:      Chest wall: No tenderness.   Abdominal:      General: Bowel sounds are normal. There is no distension.      Palpations: Abdomen is soft. There is no mass.       Tenderness: There is no abdominal tenderness. There is no guarding or rebound.      Hernia: No hernia is present.   Musculoskeletal:         General: No tenderness or deformity. Normal range of motion.      Cervical back: Normal range of motion and neck supple.   Lymphadenopathy:      Cervical: No cervical adenopathy.   Skin:     General: Skin is warm and dry.      Findings: No erythema or rash.   Neurological:      Mental Status: She is alert and oriented to person, place, and time.      Cranial Nerves: No cranial nerve deficit.      Motor: No abnormal muscle tone.      Coordination: Coordination normal.      Deep Tendon Reflexes: Reflexes are normal and symmetric. Reflexes normal.   Psychiatric:         Mood and Affect: Mood normal.         Behavior: Behavior normal.         Thought Content: Thought content normal.         Judgment: Judgment normal.            Diagnoses and all orders for this visit:     1. Ulcerative pancolitis with rectal bleeding (HCC) (Primary)  -     CBC & Differential  -     Comprehensive Metabolic Panel  -     QuantiFERON TB Gold     2. Immunocompromised (HCC)  -     CBC & Differential  -     Comprehensive Metabolic Panel  -     QuantiFERON TB Gold        Assessment:  1. 65 y.o. female from [place], [place]. with a history of left-sided ulcerative colitis diagnosed at 34 years of age, and is on Humira (every 2 weeks), Mesalamine 2 BID.     Recommendations:  1. Colonoscopy  2. Labs: Quantiferon Gold, CBC, CMP  3.      No follow-ups on file.     10/12/22 - No change from the above LORIE collins PAngel Lucia MD

## 2022-10-14 LAB
LAB AP CASE REPORT: NORMAL
PATH REPORT.FINAL DX SPEC: NORMAL
PATH REPORT.GROSS SPEC: NORMAL

## 2022-10-18 NOTE — PROGRESS NOTES
10/18/22       Tell her that the colon polyps that were removed were not cancerous and not precancerous, which is good.  The other colon biopsies showed minimal chronic inflammation suggesting that her ulcerative colitis is nicely in remission with the medicines that she is on.  I would continue the medicines that she is on (Humira and mesalamine).  I recommend that she have a repeat colonoscopy in 1 to 2 years.        Please send a copy of this report to her PCP.  Raz webster with

## 2022-10-24 ENCOUNTER — TELEPHONE (OUTPATIENT)
Dept: GASTROENTEROLOGY | Facility: CLINIC | Age: 67
End: 2022-10-24

## 2022-10-24 NOTE — TELEPHONE ENCOUNTER
----- Message from Joni Lucia MD sent at 10/18/2022  2:49 PM EDT -----  10/18/22       Tell her that the colon polyps that were removed were not cancerous and not precancerous, which is good.  The other colon biopsies showed minimal chronic inflammation suggesting that her ulcerative colitis is nicely in remission with the medicines that she is on.  I would continue the medicines that she is on (Humira and mesalamine).  I recommend that she have a repeat colonoscopy in 1 to 2 years.        Please send a copy of this report to her PCP.  Raz webster with

## 2022-10-24 NOTE — TELEPHONE ENCOUNTER
Call to pt.  Advise per DR Lucia note.  Verb understanding.     C/s for 10/12/23 placed in recall and HM.     Path report faxed via epic to DR Dora Ross.

## 2022-12-13 DIAGNOSIS — K51.00 ULCERATIVE PANCOLITIS WITHOUT COMPLICATION: Primary | ICD-10-CM

## 2022-12-13 DIAGNOSIS — D84.9 IMMUNOCOMPROMISED: ICD-10-CM

## 2022-12-13 RX ORDER — MESALAMINE 0.38 G/1
CAPSULE, EXTENDED RELEASE ORAL
Qty: 120 CAPSULE | Refills: 11 | Status: SHIPPED | OUTPATIENT
Start: 2022-12-13
